# Patient Record
Sex: FEMALE | Race: WHITE | NOT HISPANIC OR LATINO | Employment: UNEMPLOYED | URBAN - METROPOLITAN AREA
[De-identification: names, ages, dates, MRNs, and addresses within clinical notes are randomized per-mention and may not be internally consistent; named-entity substitution may affect disease eponyms.]

---

## 2018-04-19 ENCOUNTER — HOSPITAL ENCOUNTER (EMERGENCY)
Facility: HOSPITAL | Age: 53
Discharge: HOME | End: 2018-04-19
Attending: EMERGENCY MEDICINE | Admitting: EMERGENCY MEDICINE
Payer: COMMERCIAL

## 2018-04-19 VITALS
HEART RATE: 93 BPM | BODY MASS INDEX: 35.87 KG/M2 | TEMPERATURE: 97.9 F | HEIGHT: 61 IN | RESPIRATION RATE: 16 BRPM | SYSTOLIC BLOOD PRESSURE: 170 MMHG | OXYGEN SATURATION: 95 % | WEIGHT: 190 LBS | DIASTOLIC BLOOD PRESSURE: 79 MMHG

## 2018-04-19 DIAGNOSIS — L02.91 ABSCESS: Primary | ICD-10-CM

## 2018-04-19 PROCEDURE — 99282 EMERGENCY DEPT VISIT SF MDM: CPT

## 2018-04-19 RX ORDER — LIDOCAINE HYDROCHLORIDE 10 MG/ML
5 INJECTION, SOLUTION EPIDURAL; INFILTRATION; INTRACAUDAL; PERINEURAL ONCE
Status: COMPLETED | OUTPATIENT
Start: 2018-04-19 | End: 2018-04-19

## 2018-04-19 RX ADMIN — LIDOCAINE HYDROCHLORIDE 5 ML: 10 INJECTION, SOLUTION EPIDURAL; INFILTRATION; INTRACAUDAL; PERINEURAL at 18:14

## 2018-04-19 NOTE — ED NOTES
Hard, red, swollen, tender approx size of quarter area on chest  Started about 1 week ago  Pt reports no drainage       Dorothea Mcqueen RN  04/19/18 4420

## 2018-04-19 NOTE — ED PROVIDER NOTES
History  Chief Complaint   Patient presents with    Abscess     abscess in middle of chest     6-year-old female presents today complaining of an abscess in the center of her chest which has been present for approximately 1 week  States she tried to pop it herself but was unsuccessful  Denies fever  Denies previous abscesses  History provided by:  Patient  Abscess   Location:  Torso  Torso abscess location: Central chest   Size:  2  Abscess quality: induration, painful and redness    Abscess quality: no fluctuance    Red streaking: no    Duration:  1 week  Progression:  Worsening  Pain details:     Quality:  Aching    Severity:  Moderate    Duration:  1 week    Timing:  Constant    Progression:  Worsening  Chronicity:  New  Relieved by:  None tried  Worsened by:  Nothing  Ineffective treatments:  None tried  Associated symptoms: no fatigue, no fever and no headaches    Risk factors: no family hx of MRSA, no hx of MRSA and no prior abscess        None       History reviewed  No pertinent past medical history  Past Surgical History:   Procedure Laterality Date    NEPHRECTOMY         History reviewed  No pertinent family history  I have reviewed and agree with the history as documented  Social History   Substance Use Topics    Smoking status: Current Every Day Smoker     Types: Cigarettes    Smokeless tobacco: Never Used    Alcohol use No        Review of Systems   Constitutional: Negative for chills, fatigue and fever  HENT: Negative for postnasal drip, sore throat and trouble swallowing  Respiratory: Negative for chest tightness and shortness of breath  Gastrointestinal: Negative for abdominal pain  Genitourinary: Negative for dysuria  Skin: Positive for rash  Allergic/Immunologic: Negative for immunocompromised state  Neurological: Negative for dizziness, light-headedness and headaches         Physical Exam  ED Triage Vitals [04/19/18 1703]   Temperature Pulse Respirations Blood Pressure SpO2   97 9 °F (36 6 °C) 93 16 170/79 95 %      Temp Source Heart Rate Source Patient Position - Orthostatic VS BP Location FiO2 (%)   Oral Monitor Sitting Left arm --      Pain Score       No Pain           Orthostatic Vital Signs  Vitals:    04/19/18 1703   BP: 170/79   Pulse: 93   Patient Position - Orthostatic VS: Sitting       Physical Exam   Constitutional: She appears well-developed and well-nourished  HENT:   Head: Normocephalic and atraumatic  Pulmonary/Chest: Effort normal        Small 2 cm area of induration and fluctuance which is extremely tender to touch and center of the chest   There is no pointing or drainage present  Skin: Skin is warm and dry  Psychiatric: She has a normal mood and affect  Nursing note and vitals reviewed  ED Medications  Medications   lidocaine (PF) (XYLOCAINE-MPF) 1 % injection 5 mL (5 mL Infiltration Given 4/19/18 1814)       Diagnostic Studies  Results Reviewed     None                 No orders to display              Procedures  Incision/Drainage  Date/Time: 4/19/2018 6:47 PM  Performed by: Deepak Liu by: Kraig Irvin     Patient location:  ED  Consent:     Consent obtained:  Verbal    Consent given by:  Patient    Risks discussed:  Bleeding, incomplete drainage and pain    Alternatives discussed:  No treatment and delayed treatment  Universal protocol:     Procedure explained and questions answered to patient or proxy's satisfaction: yes      Patient identity confirmed:  Verbally with patient  Location:     Type:  Abscess    Size:  2    Location:  Trunk    Trunk location:  Chest  Pre-procedure details:     Skin preparation:  Betadine  Anesthesia (see MAR for exact dosages):      Anesthesia method:  Local infiltration    Local anesthetic:  Lidocaine 1% w/o epi  Procedure details:     Complexity:  Simple    Needle aspiration: no      Incision types:  Stab incision    Scalpel blade:  11    Wound management:  Probed and deloculated Drainage:  Purulent    Drainage amount: Moderate    Wound treatment:  Wound left open    Packing materials:  None  Post-procedure details:     Patient tolerance of procedure: Tolerated well, no immediate complications           Phone Contacts  ED Phone Contact    ED Course  ED Course                                MDM  Number of Diagnoses or Management Options  Abscess: new and requires workup  Risk of Complications, Morbidity, and/or Mortality  Presenting problems: low  Management options: low    Patient Progress  Patient progress: stable    CritCare Time    Disposition  Final diagnoses:   Abscess     Time reflects when diagnosis was documented in both MDM as applicable and the Disposition within this note     Time User Action Codes Description Comment    4/19/2018  6:08 PM Roosevelt Johns Add [L02 91] Abscess       ED Disposition     ED Disposition Condition Comment    Discharge  [facility] discharge to home/self care  Condition at discharge: Stable        Follow-up Information     Follow up With Specialties Details Why Contact Info Osbaldo Key DO Internal Medicine Schedule an appointment as soon as possible for a visit  [place] [id number] [address] [place] [address] Emergency Department Emergency Medicine In 2 days For wound re-check [address] [address] ED, [address] [place] [address], [phone]        There are no discharge medications for this patient  No discharge procedures on file      ED Provider  Electronically Signed by           Juan Khan DO  04/19/18 7118

## 2018-04-19 NOTE — DISCHARGE INSTRUCTIONS
Abscess   WHAT YOU NEED TO KNOW:   A warm compress may help your abscess drain  Your healthcare provider may make a cut in the abscess so it can drain  You may need surgery to remove an abscess that is on your hands or buttocks  DISCHARGE INSTRUCTIONS:   Return to the emergency department if:   · The area around your abscess becomes very painful, warm, or has red streaks  · You have a fever and chills  · Your heart is beating faster than usual      · You feel faint or confused  Contact your healthcare provider if:   · Your abscess gets bigger or does not get better  · Your abscess returns  · You have questions or concerns about your condition or care  Medicines: You may  need any of the following:  · Antibiotics  help treat a bacterial infection  · Acetaminophen  decreases pain and fever  It is available without a doctor's order  Ask how much to take and how often to take it  Follow directions  Acetaminophen can cause liver damage if not taken correctly  · NSAIDs , such as ibuprofen, help decrease swelling, pain, and fever  This medicine is available with or without a doctor's order  NSAIDs can cause stomach bleeding or kidney problems in certain people  If you take blood thinner medicine, always ask your healthcare provider if NSAIDs are safe for you  Always read the medicine label and follow directions  · Take your medicine as directed  Contact your healthcare provider if you think your medicine is not helping or if you have side effects  Tell him or her if you are allergic to any medicine  Keep a list of the medicines, vitamins, and herbs you take  Include the amounts, and when and why you take them  Bring the list or the pill bottles to follow-up visits  Carry your medicine list with you in case of an emergency  Self-care:   · Apply a warm compress to your abscess  This will help it open and drain  Wet a washcloth in warm, but not hot, water  Apply the compress for 10 minutes  Repeat this 4 times each day  Do not  press on an abscess or try to open it with a needle  You may push the bacteria deeper or into your blood  · Do not share your clothes, towels, or sheets with anyone  This can spread the infection to others  · Wash your hands often  This can help prevent the spread of germs  Use soap and water or an alcohol-based hand rub  Care for your wound after it is drained:   · Care for your wound as directed  If your healthcare provider says it is okay, carefully remove the bandage and gauze packing  You may need to soak the gauze to get it out of your wound  Clean your wound and the area around it as directed  Dry the area and put on new, clean bandages  Change your bandages when they get wet or dirty  · Ask your healthcare provider how to change the gauze in your wound  Keep track of how many pieces of gauze are placed inside the wound  Do not put too much packing in the wound  Do not pack the gauze too tightly in your wound  Follow up with your healthcare provider in 1 to 3 days: You may need to have your packing removed or your bandage changed  Write down your questions so you remember to ask them during your visits  © 2017 [employer]  Information is for End User's use only and may not be sold, redistributed or otherwise used for commercial purposes  All illustrations and images included in CareNotes® are the copyrighted property of [employer]  or [employer] Santana  The above information is an  only  It is not intended as medical advice for individual conditions or treatments  Talk to your doctor, nurse or pharmacist before following any medical regimen to see if it is safe and effective for you

## 2018-06-21 ENCOUNTER — HOSPITAL ENCOUNTER (EMERGENCY)
Facility: HOSPITAL | Age: 53
Discharge: HOME | End: 2018-06-21
Attending: EMERGENCY MEDICINE | Admitting: EMERGENCY MEDICINE
Payer: COMMERCIAL

## 2018-06-21 ENCOUNTER — APPOINTMENT (EMERGENCY)
Dept: CT IMAGING | Facility: HOSPITAL | Age: 53
End: 2018-06-21
Payer: COMMERCIAL

## 2018-06-21 VITALS
BODY MASS INDEX: 44.56 KG/M2 | HEART RATE: 90 BPM | TEMPERATURE: 98.4 F | OXYGEN SATURATION: 95 % | WEIGHT: 236 LBS | SYSTOLIC BLOOD PRESSURE: 112 MMHG | HEIGHT: 61 IN | RESPIRATION RATE: 16 BRPM | DIASTOLIC BLOOD PRESSURE: 59 MMHG

## 2018-06-21 DIAGNOSIS — R10.32 LEFT LOWER QUADRANT PAIN: Primary | ICD-10-CM

## 2018-06-21 DIAGNOSIS — K57.92 DIVERTICULITIS: ICD-10-CM

## 2018-06-21 LAB
ALBUMIN SERPL BCP-MCNC: 3.8 G/DL (ref 3.5–5)
ALP SERPL-CCNC: 75 U/L (ref 46–116)
ALT SERPL W P-5'-P-CCNC: 41 U/L (ref 12–78)
ANION GAP SERPL CALCULATED.3IONS-SCNC: 9 MMOL/L (ref 4–13)
AST SERPL W P-5'-P-CCNC: 20 U/L (ref 5–45)
BASOPHILS # BLD AUTO: 0.1 THOUSANDS/ΜL (ref 0–0.1)
BASOPHILS NFR BLD AUTO: 1 % (ref 0–1)
BILIRUB SERPL-MCNC: 0.3 MG/DL (ref 0.2–1)
BUN SERPL-MCNC: 18 MG/DL (ref 5–25)
CALCIUM SERPL-MCNC: 9.8 MG/DL (ref 8.3–10.1)
CHLORIDE SERPL-SCNC: 101 MMOL/L (ref 100–108)
CO2 SERPL-SCNC: 28 MMOL/L (ref 21–32)
CREAT SERPL-MCNC: 1.02 MG/DL (ref 0.6–1.3)
EOSINOPHIL # BLD AUTO: 0.19 THOUSAND/ΜL (ref 0–0.61)
EOSINOPHIL NFR BLD AUTO: 2 % (ref 0–6)
ERYTHROCYTE [DISTWIDTH] IN BLOOD BY AUTOMATED COUNT: 13.4 % (ref 11.6–15.1)
GFR SERPL CREATININE-BSD FRML MDRD: 64 ML/MIN/1.73SQ M
GLUCOSE SERPL-MCNC: 111 MG/DL (ref 65–140)
HCT VFR BLD AUTO: 45.6 % (ref 34.8–46.1)
HGB BLD-MCNC: 15 G/DL (ref 11.5–15.4)
IMM GRANULOCYTES # BLD AUTO: 0.12 THOUSAND/UL (ref 0–0.2)
IMM GRANULOCYTES NFR BLD AUTO: 1 % (ref 0–2)
LIPASE SERPL-CCNC: 142 U/L (ref 73–393)
LYMPHOCYTES # BLD AUTO: 3.55 THOUSANDS/ΜL (ref 0.6–4.47)
LYMPHOCYTES NFR BLD AUTO: 32 % (ref 14–44)
MCH RBC QN AUTO: 29 PG (ref 26.8–34.3)
MCHC RBC AUTO-ENTMCNC: 32.9 G/DL (ref 31.4–37.4)
MCV RBC AUTO: 88 FL (ref 82–98)
MONOCYTES # BLD AUTO: 0.8 THOUSAND/ΜL (ref 0.17–1.22)
MONOCYTES NFR BLD AUTO: 7 % (ref 4–12)
NEUTROPHILS # BLD AUTO: 6.5 THOUSANDS/ΜL (ref 1.85–7.62)
NEUTS SEG NFR BLD AUTO: 57 % (ref 43–75)
NRBC BLD AUTO-RTO: 0 /100 WBCS
PLATELET # BLD AUTO: 337 THOUSANDS/UL (ref 149–390)
PMV BLD AUTO: 9.1 FL (ref 8.9–12.7)
POTASSIUM SERPL-SCNC: 3.9 MMOL/L (ref 3.5–5.3)
PROT SERPL-MCNC: 8.2 G/DL (ref 6.4–8.2)
RBC # BLD AUTO: 5.18 MILLION/UL (ref 3.81–5.12)
SODIUM SERPL-SCNC: 138 MMOL/L (ref 136–145)
WBC # BLD AUTO: 11.26 THOUSAND/UL (ref 4.31–10.16)

## 2018-06-21 PROCEDURE — 99284 EMERGENCY DEPT VISIT MOD MDM: CPT

## 2018-06-21 PROCEDURE — 74176 CT ABD & PELVIS W/O CONTRAST: CPT

## 2018-06-21 PROCEDURE — 80053 COMPREHEN METABOLIC PANEL: CPT | Performed by: EMERGENCY MEDICINE

## 2018-06-21 PROCEDURE — 36415 COLL VENOUS BLD VENIPUNCTURE: CPT | Performed by: EMERGENCY MEDICINE

## 2018-06-21 PROCEDURE — 85025 COMPLETE CBC W/AUTO DIFF WBC: CPT | Performed by: EMERGENCY MEDICINE

## 2018-06-21 PROCEDURE — 83690 ASSAY OF LIPASE: CPT | Performed by: EMERGENCY MEDICINE

## 2018-06-21 RX ORDER — CIPROFLOXACIN 500 MG/1
500 TABLET, FILM COATED ORAL ONCE
Status: COMPLETED | OUTPATIENT
Start: 2018-06-21 | End: 2018-06-21

## 2018-06-21 RX ORDER — METRONIDAZOLE 500 MG/1
500 TABLET ORAL ONCE
Status: COMPLETED | OUTPATIENT
Start: 2018-06-21 | End: 2018-06-21

## 2018-06-21 RX ORDER — METRONIDAZOLE 500 MG/1
500 TABLET ORAL EVERY 8 HOURS SCHEDULED
Qty: 30 TABLET | Refills: 0 | Status: SHIPPED | OUTPATIENT
Start: 2018-06-21 | End: 2018-07-01

## 2018-06-21 RX ORDER — CIPROFLOXACIN 500 MG/1
500 TABLET, FILM COATED ORAL EVERY 12 HOURS SCHEDULED
Qty: 20 TABLET | Refills: 0 | Status: SHIPPED | OUTPATIENT
Start: 2018-06-21 | End: 2018-07-01

## 2018-06-21 RX ADMIN — CIPROFLOXACIN 500 MG: 500 TABLET, FILM COATED ORAL at 22:03

## 2018-06-21 RX ADMIN — METRONIDAZOLE 500 MG: 500 TABLET ORAL at 22:03

## 2018-06-21 NOTE — ED PROVIDER NOTES
History  Chief Complaint   Patient presents with    Abdominal Pain     pt states "diverticulitis is acting up"      55yo female is coming in with LLQ pain, aching that she states is her "diverticulitis", pt states she has had nearly a dozen bouts with diveriticulitis all treated with two abx that is went away a few days after starting treatment  She has never had a perforation or a complication  She has never seen or talked about surgery for recurrent diverticulitis  Her last episode was a few months ago  Has h/o also of a nephrectomy for kidney cancer but has been in remission for years  History provided by:  Patient  Abdominal Pain   Pain location:  LLQ  Pain quality: aching    Pain radiates to:  Does not radiate  Pain severity:  Moderate  Onset quality:  Gradual  Duration:  1 week  Timing:  Constant  Progression:  Worsening  Chronicity:  Recurrent  Context: not previous surgeries (h/o nephrectomy for renal cancer in the past, had h/o recurrent diverticulitis for the past she states a dozen times and always gets better with abx)    Relieved by:  Nothing  Worsened by: Movement  Ineffective treatments:  None tried  Associated symptoms: no chest pain, no chills, no constipation, no dysuria, no fatigue, no hematuria, no nausea, no shortness of breath and no vomiting    Risk factors: obesity    Risk factors: no NSAID use and no recent hospitalization        None       Past Medical History:   Diagnosis Date    Diabetes mellitus ([facility] [address] )     Diverticulitis        Past Surgical History:   Procedure Laterality Date    NEPHRECTOMY      NEPHRECTOMY Right        History reviewed  No pertinent family history  I have reviewed and agree with the history as documented  Social History   Substance Use Topics    Smoking status: Current Every Day Smoker     Types: Cigarettes    Smokeless tobacco: Never Used    Alcohol use No        Review of Systems   Constitutional: Negative for chills and fatigue     Respiratory: Negative for shortness of breath  Cardiovascular: Negative for chest pain  Gastrointestinal: Positive for abdominal pain  Negative for constipation, nausea and vomiting  Genitourinary: Negative for dysuria and hematuria  All other systems reviewed and are negative  Physical Exam  Physical Exam   Constitutional: She is oriented to person, place, and time  She appears well-developed and well-nourished  No distress  HENT:   Head: Normocephalic and atraumatic  Eyes: Conjunctivae and EOM are normal  Pupils are equal, round, and reactive to light  Neck: Normal range of motion  Neck supple  Cardiovascular: Normal rate, regular rhythm and normal heart sounds  Pulmonary/Chest: Effort normal and breath sounds normal  No respiratory distress  She has no wheezes  Abdominal: Soft  Bowel sounds are normal  She exhibits no distension  There is tenderness (LLQ)  There is no rebound  Musculoskeletal: Normal range of motion  She exhibits no edema  Neurological: She is alert and oriented to person, place, and time  Skin: Skin is warm and dry  She is not diaphoretic  Psychiatric: She has a normal mood and affect  Nursing note and vitals reviewed        Vital Signs  ED Triage Vitals [06/21/18 1754]   Temperature Pulse Respirations Blood Pressure SpO2   98 4 °F (36 9 °C) 95 16 151/83 94 %      Temp Source Heart Rate Source Patient Position - Orthostatic VS BP Location FiO2 (%)   Oral Monitor Sitting Left arm --      Pain Score       5           Vitals:    06/21/18 1754 06/21/18 2108   BP: 151/83 112/59   Pulse: 95 90   Patient Position - Orthostatic VS: Sitting        Visual Acuity      ED Medications  Medications   ciprofloxacin (CIPRO) tablet 500 mg (500 mg Oral Given 6/21/18 2203)   metroNIDAZOLE (FLAGYL) tablet 500 mg (500 mg Oral Given 6/21/18 2203)       Diagnostic Studies  Results Reviewed     Procedure Component Value Units Date/Time    Comprehensive metabolic panel [[id number]] Collected: 06/21/18 1939    Lab Status:  Final result Specimen:  Blood from Arm, Right Updated:  06/21/18 2002     Sodium 138 mmol/L      Potassium 3 9 mmol/L      Chloride 101 mmol/L      CO2 28 mmol/L      Anion Gap 9 mmol/L      BUN 18 mg/dL      Creatinine 1 02 mg/dL      Glucose 111 mg/dL      Calcium 9 8 mg/dL      AST 20 U/L      ALT 41 U/L      Alkaline Phosphatase 75 U/L      Total Protein 8 2 g/dL      Albumin 3 8 g/dL      Total Bilirubin 0 30 mg/dL      eGFR 64 ml/min/1 73sq m     Narrative:         National Kidney Disease Education Program recommendations are as follows:  GFR calculation is accurate only with a steady state creatinine  Chronic Kidney disease less than 60 ml/min/1 73 sq  meters  Kidney failure less than 15 ml/min/1 73 sq  meters  Lipase [[id number]]  (Normal) Collected:  06/21/18 1939    Lab Status:  Final result Specimen:  Blood from Arm, Right Updated:  06/21/18 2002     Lipase 142 u/L     CBC and differential [[id number]]  (Abnormal) Collected:  06/21/18 1939    Lab Status:  Final result Specimen:  Blood from Arm, Right Updated:  06/21/18 1945     WBC 11 26 (H) Thousand/uL      RBC 5 18 (H) Million/uL      Hemoglobin 15 0 g/dL      Hematocrit 45 6 %      MCV 88 fL      MCH 29 0 pg      MCHC 32 9 g/dL      RDW 13 4 %      MPV 9 1 fL      Platelets 675 Thousands/uL      nRBC 0 /100 WBCs      Neutrophils Relative 57 %      Immat GRANS % 1 %      Lymphocytes Relative 32 %      Monocytes Relative 7 %      Eosinophils Relative 2 %      Basophils Relative 1 %      Neutrophils Absolute 6 50 Thousands/µL      Immature Grans Absolute 0 12 Thousand/uL      Lymphocytes Absolute 3 55 Thousands/µL      Monocytes Absolute 0 80 Thousand/µL      Eosinophils Absolute 0 19 Thousand/µL      Basophils Absolute 0 10 Thousands/µL                  CT abdomen pelvis wo contrast   Final Result by Praful Cano MD (06/21 8840)      Uncomplicated sigmoid diverticulitis  Hepatic steatosis and hepatomegaly  Workstation performed: [id number]                    Procedures  Procedures       Phone Contacts  ED Phone Contact    ED Course                               MDM  Number of Diagnoses or Management Options  Diverticulitis: new and requires workup  Left lower quadrant pain: new and requires workup     Amount and/or Complexity of Data Reviewed  Clinical lab tests: ordered and reviewed  Tests in the radiology section of CPT®: ordered and reviewed    Risk of Complications, Morbidity, and/or Mortality  Presenting problems: high    Patient Progress  Patient progress: improved    CritCare Time    Disposition  Final diagnoses:   Left lower quadrant pain   Diverticulitis     Time reflects when diagnosis was documented in both MDM as applicable and the Disposition within this note     Time User Action Codes Description Comment    6/21/2018  9:04 PM Jose Alberto Bowser Add [R10 32] Left lower quadrant pain     6/21/2018  9:48 PM Lamar LÓPEZ Add [K57 92] Diverticulitis       ED Disposition     ED Disposition Condition Comment    Discharge  Sushma Villalobos discharge to home/self care      Condition at discharge: Good        Follow-up Information     Follow up With Specialties Details Why [address] [place] [address], DO Internal Medicine   [address] Surgery   [address] [place] [address] [address] [place] [address], MD Gastroenterology   [address]  [facility] [address]  [phone]            Discharge Medication List as of 6/21/2018  9:05 PM      START taking these medications    Details   ciprofloxacin (CIPRO) 500 mg tablet Take 1 tablet (500 mg total) by mouth every 12 (twelve) hours for 10 days, Starting Thu 6/21/2018, Until Sun 7/1/2018, Print      metroNIDAZOLE (FLAGYL) 500 mg tablet Take 1 tablet (500 mg total) by mouth every 8 (eight) hours for 10 days, Starting Thu 6/21/2018, Until Sun 7/1/2018, Print No discharge procedures on file      ED Provider  Electronically Signed by           Thiago Herrera MD  06/22/18 5664

## 2018-06-21 NOTE — ED NOTES
Patient states she is not able to urinate at this time   Will ring the call [facility] when she wants to try        Veronica Marie, RN  06/21/18 4568

## 2018-06-22 NOTE — DISCHARGE INSTRUCTIONS
Abdominal Pain   WHAT YOU NEED TO KNOW:   Abdominal pain can be dull, achy, or sharp  You may have pain in one area of your abdomen, or in your entire abdomen  Your pain may be caused by a condition such as constipation, food sensitivity or poisoning, infection, or a blockage  Abdominal pain can also be from a hernia, appendicitis, or an ulcer  Liver, gallbladder, or kidney conditions can also cause abdominal pain  The cause of your abdominal pain may be unknown  DISCHARGE INSTRUCTIONS:   Return to the emergency department if:   · You have new chest pain or shortness of breath  · You have pulsing pain in your upper abdomen or lower back that suddenly becomes constant  · Your pain is in the right lower abdominal area and worsens with movement  · You have a fever over 100 4°F (38°C) or shaking chills  · You are vomiting and cannot keep food or liquids down  · Your pain does not improve or gets worse over the next 8 to 12 hours  · You see blood in your vomit or bowel movements, or they look black and tarry  · Your skin or the whites of your eyes turn yellow  · You are a woman and have a large amount of vaginal bleeding that is not your monthly period  Contact your healthcare provider if:   · You have pain in your lower back  · You are a man and have pain in your testicles  · You have pain when you urinate  · You have questions or concerns about your condition or care  Follow up with your healthcare provider within 24 hours or as directed:  Write down your questions so you remember to ask them during your visits  Medicines:   · Medicines  may be given to calm your stomach and prevent vomiting or to decrease pain  Ask how to take pain medicine safely  · Take your medicine as directed  Contact your healthcare provider if you think your medicine is not helping or if you have side effects  Tell him of her if you are allergic to any medicine   Keep a list of the medicines, vitamins, and herbs you take  Include the amounts, and when and why you take them  Bring the list or the pill bottles to follow-up visits  Carry your medicine list with you in case of an emergency  © 2017 [employer] Information is for End User's use only and may not be sold, redistributed or otherwise used for commercial purposes  All illustrations and images included in CareNotes® are the copyrighted property of A D A M , [employer]  or [employer]ana  The above information is an  only  It is not intended as medical advice for individual conditions or treatments  Talk to your doctor, nurse or pharmacist before following any medical regimen to see if it is safe and effective for you  Diverticulitis   WHAT YOU NEED TO KNOW:   Diverticulitis is a condition that causes small pockets along your intestine called diverticula to become inflamed or infected  This is caused by hard bowel movements, food, or bacteria that get stuck in the pockets  DISCHARGE INSTRUCTIONS:   Return to the emergency department if:   · You have bowel movement or foul-smelling discharge leaking from your vagina or in your urine  · You have severe diarrhea  · You urinate less than usual or not at all  · You are not able to have a bowel movement  · You cannot stop vomiting  · You have severe abdominal pain, a fever, and your abdomen is larger than usual      · You have new or increased blood in your bowel movements  Contact your healthcare provider if:   · You have pain when you urinate  · Your symptoms get worse or do not go away  · You have questions or concerns about your condition or care  Medicines:   · Antibiotics  may be given to help treat a bacterial infection  · Prescription pain medicine  may be given  Ask your healthcare provider how to take this medicine safely  Some prescription pain medicines contain acetaminophen   Do not take other medicines that contain acetaminophen without talking to your healthcare provider  Too much acetaminophen may cause liver damage  Prescription pain medicine may cause constipation  Ask your healthcare provider how to prevent or treat constipation  · Take your medicine as directed  Contact your healthcare provider if you think your medicine is not helping or if you have side effects  Tell him or her if you are allergic to any medicine  Keep a list of the medicines, vitamins, and herbs you take  Include the amounts, and when and why you take them  Bring the list or the pill bottles to follow-up visits  Carry your medicine list with you in case of an emergency  Clear liquid diet:  A clear liquid diet includes any liquids that you can see through  Examples include water, ginger-khadar, cranberry or apple juice, frozen fruit ice, or broth  Stay on a clear liquid diet until your symptoms are gone, or as directed  Follow up with your healthcare provider as directed: You may need to return for a colonoscopy  When your symptoms are gone, you may need a low-fat, high-fiber diet to prevent diverticulitis from developing again  Your healthcare provider or dietitian can help you create meal plans  Write down your questions so you remember to ask them during your visits  © 2017 [employer]  Information is for End User's use only and may not be sold, redistributed or otherwise used for commercial purposes  All illustrations and images included in CareNotes® are the copyrighted property of [employer]  or [employer]oko Santana  The above information is an  only  It is not intended as medical advice for individual conditions or treatments  Talk to your doctor, nurse or pharmacist before following any medical regimen to see if it is safe and effective for you

## 2018-08-13 ENCOUNTER — HOSPITAL ENCOUNTER (INPATIENT)
Facility: HOSPITAL | Age: 53
LOS: 5 days | Discharge: HOME | DRG: 244 | End: 2018-08-18
Attending: EMERGENCY MEDICINE | Admitting: INTERNAL MEDICINE
Payer: COMMERCIAL

## 2018-08-13 ENCOUNTER — APPOINTMENT (EMERGENCY)
Dept: CT IMAGING | Facility: HOSPITAL | Age: 53
DRG: 244 | End: 2018-08-13
Payer: COMMERCIAL

## 2018-08-13 DIAGNOSIS — K57.92 DIVERTICULITIS: Primary | ICD-10-CM

## 2018-08-13 LAB
ALBUMIN SERPL BCP-MCNC: 3.3 G/DL (ref 3.5–5)
ALP SERPL-CCNC: 71 U/L (ref 46–116)
ALT SERPL W P-5'-P-CCNC: 24 U/L (ref 12–78)
ANION GAP SERPL CALCULATED.3IONS-SCNC: 6 MMOL/L (ref 4–13)
AST SERPL W P-5'-P-CCNC: 16 U/L (ref 5–45)
BACTERIA UR QL AUTO: ABNORMAL /HPF
BASOPHILS # BLD AUTO: 0.05 THOUSANDS/ΜL (ref 0–0.1)
BASOPHILS NFR BLD AUTO: 0 % (ref 0–1)
BILIRUB SERPL-MCNC: 0.6 MG/DL (ref 0.2–1)
BILIRUB UR QL STRIP: ABNORMAL
BUN SERPL-MCNC: 9 MG/DL (ref 5–25)
CALCIUM SERPL-MCNC: 9.5 MG/DL (ref 8.3–10.1)
CHLORIDE SERPL-SCNC: 99 MMOL/L (ref 100–108)
CLARITY UR: CLEAR
CO2 SERPL-SCNC: 31 MMOL/L (ref 21–32)
COLOR UR: ABNORMAL
CREAT SERPL-MCNC: 0.92 MG/DL (ref 0.6–1.3)
EOSINOPHIL # BLD AUTO: 0.14 THOUSAND/ΜL (ref 0–0.61)
EOSINOPHIL NFR BLD AUTO: 1 % (ref 0–6)
ERYTHROCYTE [DISTWIDTH] IN BLOOD BY AUTOMATED COUNT: 13.5 % (ref 11.6–15.1)
GFR SERPL CREATININE-BSD FRML MDRD: 72 ML/MIN/1.73SQ M
GLUCOSE SERPL-MCNC: 141 MG/DL (ref 65–140)
GLUCOSE SERPL-MCNC: 148 MG/DL (ref 65–140)
GLUCOSE SERPL-MCNC: 155 MG/DL (ref 65–140)
GLUCOSE UR STRIP-MCNC: ABNORMAL MG/DL
HCT VFR BLD AUTO: 42.9 % (ref 34.8–46.1)
HGB BLD-MCNC: 14.6 G/DL (ref 11.5–15.4)
HGB UR QL STRIP.AUTO: ABNORMAL
IMM GRANULOCYTES # BLD AUTO: 0.12 THOUSAND/UL (ref 0–0.2)
IMM GRANULOCYTES NFR BLD AUTO: 1 % (ref 0–2)
KETONES UR STRIP-MCNC: NEGATIVE MG/DL
LEUKOCYTE ESTERASE UR QL STRIP: NEGATIVE
LIPASE SERPL-CCNC: 224 U/L (ref 73–393)
LYMPHOCYTES # BLD AUTO: 2.66 THOUSANDS/ΜL (ref 0.6–4.47)
LYMPHOCYTES NFR BLD AUTO: 21 % (ref 14–44)
MCH RBC QN AUTO: 30 PG (ref 26.8–34.3)
MCHC RBC AUTO-ENTMCNC: 34 G/DL (ref 31.4–37.4)
MCV RBC AUTO: 88 FL (ref 82–98)
MONOCYTES # BLD AUTO: 0.92 THOUSAND/ΜL (ref 0.17–1.22)
MONOCYTES NFR BLD AUTO: 7 % (ref 4–12)
NEUTROPHILS # BLD AUTO: 8.73 THOUSANDS/ΜL (ref 1.85–7.62)
NEUTS SEG NFR BLD AUTO: 70 % (ref 43–75)
NITRITE UR QL STRIP: NEGATIVE
NON-SQ EPI CELLS URNS QL MICRO: ABNORMAL /HPF
NRBC BLD AUTO-RTO: 0 /100 WBCS
PH UR STRIP.AUTO: 6 [PH] (ref 4.5–8)
PLATELET # BLD AUTO: 326 THOUSANDS/UL (ref 149–390)
PMV BLD AUTO: 9.7 FL (ref 8.9–12.7)
POTASSIUM SERPL-SCNC: 3.6 MMOL/L (ref 3.5–5.3)
PROT SERPL-MCNC: 8.1 G/DL (ref 6.4–8.2)
PROT UR STRIP-MCNC: >=300 MG/DL
RBC # BLD AUTO: 4.87 MILLION/UL (ref 3.81–5.12)
RBC #/AREA URNS AUTO: ABNORMAL /HPF
SODIUM SERPL-SCNC: 136 MMOL/L (ref 136–145)
SP GR UR STRIP.AUTO: >=1.03 (ref 1–1.03)
UROBILINOGEN UR QL STRIP.AUTO: 2 E.U./DL
WBC # BLD AUTO: 12.62 THOUSAND/UL (ref 4.31–10.16)
WBC #/AREA URNS AUTO: ABNORMAL /HPF

## 2018-08-13 PROCEDURE — 36415 COLL VENOUS BLD VENIPUNCTURE: CPT | Performed by: PHYSICIAN ASSISTANT

## 2018-08-13 PROCEDURE — 80053 COMPREHEN METABOLIC PANEL: CPT | Performed by: PHYSICIAN ASSISTANT

## 2018-08-13 PROCEDURE — 96361 HYDRATE IV INFUSION ADD-ON: CPT

## 2018-08-13 PROCEDURE — 82948 REAGENT STRIP/BLOOD GLUCOSE: CPT

## 2018-08-13 PROCEDURE — 96375 TX/PRO/DX INJ NEW DRUG ADDON: CPT

## 2018-08-13 PROCEDURE — 81001 URINALYSIS AUTO W/SCOPE: CPT | Performed by: PHYSICIAN ASSISTANT

## 2018-08-13 PROCEDURE — 83690 ASSAY OF LIPASE: CPT | Performed by: PHYSICIAN ASSISTANT

## 2018-08-13 PROCEDURE — 99285 EMERGENCY DEPT VISIT HI MDM: CPT

## 2018-08-13 PROCEDURE — 74177 CT ABD & PELVIS W/CONTRAST: CPT

## 2018-08-13 PROCEDURE — 85025 COMPLETE CBC W/AUTO DIFF WBC: CPT | Performed by: PHYSICIAN ASSISTANT

## 2018-08-13 PROCEDURE — 96374 THER/PROPH/DIAG INJ IV PUSH: CPT

## 2018-08-13 RX ORDER — ONDANSETRON 2 MG/ML
4 INJECTION INTRAMUSCULAR; INTRAVENOUS EVERY 6 HOURS PRN
Status: DISCONTINUED | OUTPATIENT
Start: 2018-08-13 | End: 2018-08-18 | Stop reason: HOSPADM

## 2018-08-13 RX ORDER — ATORVASTATIN CALCIUM 40 MG/1
TABLET, FILM COATED ORAL
COMMUNITY
Start: 2018-08-09

## 2018-08-13 RX ORDER — ATORVASTATIN CALCIUM 40 MG/1
40 TABLET, FILM COATED ORAL
Status: DISCONTINUED | OUTPATIENT
Start: 2018-08-14 | End: 2018-08-18 | Stop reason: HOSPADM

## 2018-08-13 RX ORDER — MORPHINE SULFATE 2 MG/ML
1 INJECTION, SOLUTION INTRAMUSCULAR; INTRAVENOUS
Status: DISCONTINUED | OUTPATIENT
Start: 2018-08-13 | End: 2018-08-17

## 2018-08-13 RX ORDER — FENTANYL CITRATE 50 UG/ML
50 INJECTION, SOLUTION INTRAMUSCULAR; INTRAVENOUS ONCE
Status: COMPLETED | OUTPATIENT
Start: 2018-08-13 | End: 2018-08-13

## 2018-08-13 RX ORDER — METFORMIN HYDROCHLORIDE 500 MG/1
TABLET, EXTENDED RELEASE ORAL
COMMUNITY
Start: 2018-06-11

## 2018-08-13 RX ORDER — MORPHINE SULFATE 4 MG/ML
4 INJECTION, SOLUTION INTRAMUSCULAR; INTRAVENOUS ONCE
Status: COMPLETED | OUTPATIENT
Start: 2018-08-13 | End: 2018-08-13

## 2018-08-13 RX ORDER — SODIUM CHLORIDE 9 MG/ML
125 INJECTION, SOLUTION INTRAVENOUS CONTINUOUS
Status: DISCONTINUED | OUTPATIENT
Start: 2018-08-13 | End: 2018-08-17

## 2018-08-13 RX ORDER — FLUOXETINE HYDROCHLORIDE 20 MG/1
40 CAPSULE ORAL DAILY
Status: DISCONTINUED | OUTPATIENT
Start: 2018-08-14 | End: 2018-08-18 | Stop reason: HOSPADM

## 2018-08-13 RX ORDER — ONDANSETRON 2 MG/ML
4 INJECTION INTRAMUSCULAR; INTRAVENOUS ONCE
Status: COMPLETED | OUTPATIENT
Start: 2018-08-13 | End: 2018-08-13

## 2018-08-13 RX ORDER — DOCUSATE SODIUM 100 MG/1
100 CAPSULE, LIQUID FILLED ORAL 2 TIMES DAILY
Status: DISCONTINUED | OUTPATIENT
Start: 2018-08-14 | End: 2018-08-18 | Stop reason: HOSPADM

## 2018-08-13 RX ORDER — VARENICLINE TARTRATE 1 MG/1
TABLET, FILM COATED ORAL
COMMUNITY
Start: 2018-08-09

## 2018-08-13 RX ORDER — ACETAMINOPHEN 325 MG/1
650 TABLET ORAL EVERY 6 HOURS PRN
Status: DISCONTINUED | OUTPATIENT
Start: 2018-08-13 | End: 2018-08-18 | Stop reason: HOSPADM

## 2018-08-13 RX ORDER — FLUOXETINE HYDROCHLORIDE 40 MG/1
CAPSULE ORAL
COMMUNITY
Start: 2018-08-09

## 2018-08-13 RX ADMIN — SODIUM CHLORIDE 125 ML/HR: 0.9 INJECTION, SOLUTION INTRAVENOUS at 22:45

## 2018-08-13 RX ADMIN — MORPHINE SULFATE 4 MG: 4 INJECTION INTRAVENOUS at 18:38

## 2018-08-13 RX ADMIN — ONDANSETRON 4 MG: 2 INJECTION INTRAMUSCULAR; INTRAVENOUS at 18:37

## 2018-08-13 RX ADMIN — SODIUM CHLORIDE 1000 ML: 0.9 INJECTION, SOLUTION INTRAVENOUS at 18:36

## 2018-08-13 RX ADMIN — FENTANYL CITRATE 50 MCG: 50 INJECTION, SOLUTION INTRAMUSCULAR; INTRAVENOUS at 21:43

## 2018-08-13 RX ADMIN — PIPERACILLIN SODIUM,TAZOBACTAM SODIUM 3.38 G: 3; .375 INJECTION, POWDER, FOR SOLUTION INTRAVENOUS at 22:02

## 2018-08-13 RX ADMIN — IOHEXOL 100 ML: 350 INJECTION, SOLUTION INTRAVENOUS at 21:10

## 2018-08-13 NOTE — ED PROVIDER NOTES
History  Chief Complaint   Patient presents with    Abdominal Pain     pt co of LLQ abd pain onset 4 days ago, most w/ movememt  hx of derviticulitis, -v/d/f  + nausea  can only tolerate sips of water      73-year-old female presents to the emergency department with complaints of left lower quadrant abdominal pain  States she has had ongoing symptoms over the past 4 days  Also complains of decreased appetite with some watery stools  States she has history of diverticulitis which is previously diagnosed in June of 2018  States she did not finish her antibiotics (cipro & flagyl) at that time and restarted them 4 days ago when she started to pain  Denies fevers at home  No urinary symptoms  History provided by:  Patient   used: No    Abdominal Pain   Pain location:  LLQ  Pain quality: sharp and stabbing    Pain radiates to:  Does not radiate  Pain severity:  Moderate  Onset quality:  Gradual  Duration:  4 days  Timing:  Constant  Progression:  Waxing and waning  Chronicity:  New  Context: not alcohol use, not awakening from sleep, not diet changes, not eating, not laxative use, not medication withdrawal, not previous surgeries, not recent illness, not recent sexual activity, not recent travel, not retching, not sick contacts, not suspicious food intake and not trauma    Relieved by:  Nothing  Ineffective treatments: Antibiotics  Associated symptoms: no anorexia, no belching, no chest pain, no chills, no constipation, no cough, no diarrhea, no dysuria, no fatigue, no fever, no flatus, no hematemesis, no hematochezia, no hematuria, no melena, no nausea, no shortness of breath, no sore throat, no vaginal bleeding, no vaginal discharge and no vomiting        Prior to Admission Medications   Prescriptions Last Dose Informant Patient Reported? Taking?    FLUoxetine (PROzac) 40 MG capsule   Yes Yes   Sig: Take by mouth   atorvastatin (LIPITOR) 40 mg tablet   Yes Yes   Sig: Take by mouth metFORMIN (GLUCOPHAGE-XR) 500 mg 24 hr tablet   Yes Yes   Sig: Take by mouth   varenicline (CHANTIX) 1 mg tablet   Yes Yes   Sig: Take by mouth      Facility-Administered Medications: None       Past Medical History:   Diagnosis Date    Cancer ([facility] )     r kidney removed     Diabetes mellitus ([place] [address] )     Diverticulitis     Hyperlipidemia     Renal disorder        Past Surgical History:   Procedure Laterality Date    NEPHRECTOMY      NEPHRECTOMY Right        History reviewed  No pertinent family history  I have reviewed and agree with the history as documented  Social History   Substance Use Topics    Smoking status: Current Every Day Smoker     Types: Cigarettes    Smokeless tobacco: Never Used    Alcohol use No        Review of Systems   Constitutional: Negative for activity change, appetite change, chills, fatigue and fever  HENT: Negative for congestion, dental problem, drooling, ear discharge, ear pain, mouth sores, nosebleeds, rhinorrhea, sore throat and trouble swallowing  Eyes: Negative for pain, discharge and itching  Respiratory: Negative for cough, chest tightness, shortness of breath and wheezing  Cardiovascular: Negative for chest pain and palpitations  Gastrointestinal: Positive for abdominal pain  Negative for anorexia, blood in stool, constipation, diarrhea, flatus, hematemesis, hematochezia, melena, nausea and vomiting  Endocrine: Negative for cold intolerance and heat intolerance  Genitourinary: Negative for difficulty urinating, dysuria, flank pain, frequency, hematuria, urgency, vaginal bleeding and vaginal discharge  Skin: Negative for rash and wound  Allergic/Immunologic: Negative for food allergies and immunocompromised state  Neurological: Negative for dizziness, seizures, syncope, weakness, numbness and headaches  Psychiatric/Behavioral: Negative for agitation, behavioral problems and confusion         Physical Exam  Physical Exam   Constitutional: She is oriented to person, place, and time  She appears well-developed and well-nourished  No distress  HENT:   Head: Normocephalic and atraumatic  Right Ear: External ear normal    Left Ear: External ear normal    Mouth/Throat: Oropharynx is clear and moist  No oropharyngeal exudate  Eyes: Conjunctivae are normal    Neck: No JVD present  No tracheal deviation present  Cardiovascular: Normal rate, regular rhythm and normal heart sounds  Exam reveals no gallop and no friction rub  No murmur heard  Pulmonary/Chest: Effort normal and breath sounds normal  No respiratory distress  She has no wheezes  She has no rales  She exhibits no tenderness  Abdominal: Soft  Bowel sounds are normal  She exhibits no distension  There is tenderness in the left lower quadrant  There is no guarding  Musculoskeletal: Normal range of motion  She exhibits no edema, tenderness or deformity  Lymphadenopathy:     She has no cervical adenopathy  Neurological: She is alert and oriented to person, place, and time  Skin: Skin is warm and dry  No rash noted  She is not diaphoretic  No erythema  Psychiatric: She has a normal mood and affect  Nursing note and vitals reviewed        Vital Signs  ED Triage Vitals   Temperature Pulse Respirations Blood Pressure SpO2   08/13/18 1821 08/13/18 1821 08/13/18 1821 08/13/18 1821 08/13/18 1821   99 °F (37 2 °C) 105 19 122/78 95 %      Temp Source Heart Rate Source Patient Position - Orthostatic VS BP Location FiO2 (%)   08/13/18 1821 08/13/18 1821 08/13/18 1821 08/13/18 1821 --   Oral Monitor Sitting Right arm       Pain Score       08/13/18 1817       Worst Possible Pain           Vitals:    08/13/18 1821 08/13/18 1900 08/13/18 2124   BP: 122/78 125/64 112/58   Pulse: 105 91 83   Patient Position - Orthostatic VS: Sitting Lying Lying       Visual Acuity      ED Medications  Medications   fentanyl citrate (PF) 100 MCG/2ML 50 mcg (not administered)   piperacillin-tazobactam (ZOSYN) 3 375 g in sodium chloride 0 9 % 50 mL IVPB (not administered)   sodium chloride 0 9 % bolus 1,000 mL (1,000 mL Intravenous New Bag 8/13/18 1836)   morphine (PF) 4 mg/mL injection 4 mg (4 mg Intravenous Given 8/13/18 1838)   ondansetron (ZOFRAN) injection 4 mg (4 mg Intravenous Given 8/13/18 1837)   iohexol (OMNIPAQUE) 350 MG/ML injection (MULTI-DOSE) 100 mL (100 mL Intravenous Given 8/13/18 2110)       Diagnostic Studies  Results Reviewed     Procedure Component Value Units Date/Time    Urine Microscopic [[id number]]  (Abnormal) Collected:  08/13/18 1921    Lab Status:  Final result Specimen:  Urine from Urine, Clean Catch Updated:  08/13/18 1940     RBC, UA 0-1 (A) /hpf      WBC, UA 0-1 (A) /hpf      Epithelial Cells Moderate (A) /hpf      Bacteria, UA Occasional /hpf     UA w Reflex to Microscopic w Reflex to Culture [[id number]]  (Abnormal) Collected:  08/13/18 1921    Lab Status:  Final result Specimen:  Urine from Urine, Clean Catch Updated:  08/13/18 1932     Color, UA Isa     Clarity, UA Clear     Specific Gravity, UA >=1 030     pH, UA 6 0     Leukocytes, UA Negative     Nitrite, UA Negative     Protein, UA >=300 (A) mg/dl      Glucose,  (1/10%) (A) mg/dl      Ketones, UA Negative mg/dl      Urobilinogen, UA 2 0 (A) E U /dl      Bilirubin, UA Small (A)     Blood, UA Small (A)    Comprehensive metabolic panel [[id number]]  (Abnormal) Collected:  08/13/18 1836    Lab Status:  Final result Specimen:  Blood from Arm, Right Updated:  08/13/18 1901     Sodium 136 mmol/L      Potassium 3 6 mmol/L      Chloride 99 (L) mmol/L      CO2 31 mmol/L      Anion Gap 6 mmol/L      BUN 9 mg/dL      Creatinine 0 92 mg/dL      Glucose 155 (H) mg/dL      Calcium 9 5 mg/dL      AST 16 U/L      ALT 24 U/L      Alkaline Phosphatase 71 U/L      Total Protein 8 1 g/dL      Albumin 3 3 (L) g/dL      Total Bilirubin 0 60 mg/dL      eGFR 72 ml/min/1 73sq m     Narrative:         National Kidney Disease Education Program recommendations are as follows:  GFR calculation is accurate only with a steady state creatinine  Chronic Kidney disease less than 60 ml/min/1 73 sq  meters  Kidney failure less than 15 ml/min/1 73 sq  meters  Lipase [[id number]]  (Normal) Collected:  08/13/18 1836    Lab Status:  Final result Specimen:  Blood from Arm, Right Updated:  08/13/18 1901     Lipase 224 u/L     CBC and differential [[id number]]  (Abnormal) Collected:  08/13/18 1836    Lab Status:  Final result Specimen:  Blood from Arm, Right Updated:  08/13/18 1846     WBC 12 62 (H) Thousand/uL      RBC 4 87 Million/uL      Hemoglobin 14 6 g/dL      Hematocrit 42 9 %      MCV 88 fL      MCH 30 0 pg      MCHC 34 0 g/dL      RDW 13 5 %      MPV 9 7 fL      Platelets 297 Thousands/uL      nRBC 0 /100 WBCs      Neutrophils Relative 70 %      Immat GRANS % 1 %      Lymphocytes Relative 21 %      Monocytes Relative 7 %      Eosinophils Relative 1 %      Basophils Relative 0 %      Neutrophils Absolute 8 73 (H) Thousands/µL      Immature Grans Absolute 0 12 Thousand/uL      Lymphocytes Absolute 2 66 Thousands/µL      Monocytes Absolute 0 92 Thousand/µL      Eosinophils Absolute 0 14 Thousand/µL      Basophils Absolute 0 05 Thousands/µL     Fingerstick Glucose (POCT) [[id number]]  (Abnormal) Collected:  08/13/18 1835    Lab Status:  Final result Updated:  08/13/18 1835     POC Glucose 148 (H) mg/dl                  CT abdomen pelvis with contrast    (Results Pending)              Procedures  Procedures       Phone Contacts  ED Phone Contact    ED Course                         Initial Sepsis Screening     [address] Name 08/13/18 0921                Is the patient's history suggestive of a new or worsening infection? (!)  Yes (Proceed)  -GR        Suspected source of infection acute abdominal infection  -GR        Are two or more of the following signs & symptoms of infection both present and new to the patient?  No  -GR        Indicate SIRS criteria Leukocytosis (WBC > 32515 IJL)  -GR If the answer is yes to both questions, suspicion of sepsis is present  --        If severe sepsis is present AND tissue hypoperfusion perists in the hour after fluid resuscitation or lactate > 4, the patient meets criteria for SEPTIC SHOCK  --        Are any of the following organ dysfunction criteria present within 6 hours of suspected infection and SIRS criteria that are NOT considered to be chronic conditions? --        Organ dysfunction  --        Date of presentation of severe sepsis  --        Time of presentation of severe sepsis  --        Tissue hypoperfusion persists in the hour after crystalloid fluid administration, evidenced, by either:  --        Was hypotension present within one hour of the conclusion of crystalloid fluid administration?  --        Date of presentation of septic shock  --        Time of presentation of septic shock  --          User Key  (r) = Recorded By, (t) = Taken By, (c) = Cosigned By    [address] Name Provider Type    Nicki Martinez PA-C Physician Assistant                  MDM  Number of Diagnoses or Management Options  Diverticulitis:   Diagnosis management comments: Differential diagnosis includes but not limited to:  Diverticulitis, diverticulitis with perforation or abscess, kidney stone, hydronephrosis, colitis         Amount and/or Complexity of Data Reviewed  Clinical lab tests: ordered and reviewed  Tests in the radiology section of CPT®: ordered and reviewed      CritCare Time    Disposition  Final diagnoses:   Diverticulitis     Time reflects when diagnosis was documented in both MDM as applicable and the Disposition within this note     Time User Action Codes Description Comment    8/13/2018  9:37 PM Phillip Perez Add [G73 32] Diverticulitis       ED Disposition     ED Disposition Condition Comment    Admit  Case was discussed with PRITI and the patient's admission status was agreed to be Admission Status: inpatient status to the service of Dr Boogie Cotton   Follow-up Information    None         Patient's Medications   Discharge Prescriptions    No medications on file     No discharge procedures on file      ED Provider  Electronically Signed by           Hang Gao PA-C  08/13/18 1242

## 2018-08-14 PROBLEM — K57.92 DIVERTICULITIS: Status: ACTIVE | Noted: 2018-08-14

## 2018-08-14 PROBLEM — E11.9 TYPE 2 DIABETES MELLITUS, WITHOUT LONG-TERM CURRENT USE OF INSULIN (HCC): Chronic | Status: ACTIVE | Noted: 2018-08-14

## 2018-08-14 LAB
ANION GAP SERPL CALCULATED.3IONS-SCNC: 8 MMOL/L (ref 4–13)
BASOPHILS # BLD AUTO: 0.07 THOUSANDS/ΜL (ref 0–0.1)
BASOPHILS NFR BLD AUTO: 1 % (ref 0–1)
BUN SERPL-MCNC: 8 MG/DL (ref 5–25)
CALCIUM SERPL-MCNC: 8.6 MG/DL (ref 8.3–10.1)
CHLORIDE SERPL-SCNC: 103 MMOL/L (ref 100–108)
CO2 SERPL-SCNC: 28 MMOL/L (ref 21–32)
CREAT SERPL-MCNC: 0.84 MG/DL (ref 0.6–1.3)
EOSINOPHIL # BLD AUTO: 0.14 THOUSAND/ΜL (ref 0–0.61)
EOSINOPHIL NFR BLD AUTO: 1 % (ref 0–6)
ERYTHROCYTE [DISTWIDTH] IN BLOOD BY AUTOMATED COUNT: 13.4 % (ref 11.6–15.1)
EST. AVERAGE GLUCOSE BLD GHB EST-MCNC: 157 MG/DL
GFR SERPL CREATININE-BSD FRML MDRD: 81 ML/MIN/1.73SQ M
GLUCOSE SERPL-MCNC: 103 MG/DL (ref 65–140)
GLUCOSE SERPL-MCNC: 124 MG/DL (ref 65–140)
GLUCOSE SERPL-MCNC: 128 MG/DL (ref 65–140)
GLUCOSE SERPL-MCNC: 204 MG/DL (ref 65–140)
GLUCOSE SERPL-MCNC: 96 MG/DL (ref 65–140)
HBA1C MFR BLD: 7.1 % (ref 4.2–6.3)
HCT VFR BLD AUTO: 39.5 % (ref 34.8–46.1)
HGB BLD-MCNC: 13 G/DL (ref 11.5–15.4)
IMM GRANULOCYTES # BLD AUTO: 0.2 THOUSAND/UL (ref 0–0.2)
IMM GRANULOCYTES NFR BLD AUTO: 2 % (ref 0–2)
LYMPHOCYTES # BLD AUTO: 2.21 THOUSANDS/ΜL (ref 0.6–4.47)
LYMPHOCYTES NFR BLD AUTO: 23 % (ref 14–44)
MCH RBC QN AUTO: 29.5 PG (ref 26.8–34.3)
MCHC RBC AUTO-ENTMCNC: 32.9 G/DL (ref 31.4–37.4)
MCV RBC AUTO: 90 FL (ref 82–98)
MONOCYTES # BLD AUTO: 0.71 THOUSAND/ΜL (ref 0.17–1.22)
MONOCYTES NFR BLD AUTO: 7 % (ref 4–12)
NEUTROPHILS # BLD AUTO: 6.38 THOUSANDS/ΜL (ref 1.85–7.62)
NEUTS SEG NFR BLD AUTO: 66 % (ref 43–75)
NRBC BLD AUTO-RTO: 0 /100 WBCS
PLATELET # BLD AUTO: 285 THOUSANDS/UL (ref 149–390)
PMV BLD AUTO: 9.4 FL (ref 8.9–12.7)
POTASSIUM SERPL-SCNC: 3.5 MMOL/L (ref 3.5–5.3)
RBC # BLD AUTO: 4.4 MILLION/UL (ref 3.81–5.12)
SODIUM SERPL-SCNC: 139 MMOL/L (ref 136–145)
WBC # BLD AUTO: 9.71 THOUSAND/UL (ref 4.31–10.16)

## 2018-08-14 PROCEDURE — 99222 1ST HOSP IP/OBS MODERATE 55: CPT | Performed by: SURGERY

## 2018-08-14 PROCEDURE — 99223 1ST HOSP IP/OBS HIGH 75: CPT | Performed by: INTERNAL MEDICINE

## 2018-08-14 PROCEDURE — 80048 BASIC METABOLIC PNL TOTAL CA: CPT | Performed by: PHYSICIAN ASSISTANT

## 2018-08-14 PROCEDURE — 82948 REAGENT STRIP/BLOOD GLUCOSE: CPT

## 2018-08-14 PROCEDURE — 83036 HEMOGLOBIN GLYCOSYLATED A1C: CPT | Performed by: INTERNAL MEDICINE

## 2018-08-14 PROCEDURE — 85025 COMPLETE CBC W/AUTO DIFF WBC: CPT | Performed by: PHYSICIAN ASSISTANT

## 2018-08-14 RX ADMIN — SODIUM CHLORIDE 125 ML/HR: 0.9 INJECTION, SOLUTION INTRAVENOUS at 04:00

## 2018-08-14 RX ADMIN — MORPHINE SULFATE 1 MG: 2 INJECTION, SOLUTION INTRAMUSCULAR; INTRAVENOUS at 21:33

## 2018-08-14 RX ADMIN — PIPERACILLIN SODIUM,TAZOBACTAM SODIUM 3.38 G: 3; .375 INJECTION, POWDER, FOR SOLUTION INTRAVENOUS at 12:24

## 2018-08-14 RX ADMIN — ATORVASTATIN CALCIUM 40 MG: 40 TABLET, FILM COATED ORAL at 17:12

## 2018-08-14 RX ADMIN — DOCUSATE SODIUM 100 MG: 100 CAPSULE, LIQUID FILLED ORAL at 17:12

## 2018-08-14 RX ADMIN — INSULIN LISPRO 2 UNITS: 100 INJECTION, SOLUTION INTRAVENOUS; SUBCUTANEOUS at 21:29

## 2018-08-14 RX ADMIN — ENOXAPARIN SODIUM 40 MG: 40 INJECTION SUBCUTANEOUS at 08:24

## 2018-08-14 RX ADMIN — ACETAMINOPHEN 650 MG: 325 TABLET, FILM COATED ORAL at 15:19

## 2018-08-14 RX ADMIN — MORPHINE SULFATE 1 MG: 2 INJECTION, SOLUTION INTRAMUSCULAR; INTRAVENOUS at 08:24

## 2018-08-14 RX ADMIN — SODIUM CHLORIDE 125 ML/HR: 0.9 INJECTION, SOLUTION INTRAVENOUS at 21:24

## 2018-08-14 RX ADMIN — FLUOXETINE 40 MG: 20 CAPSULE ORAL at 08:24

## 2018-08-14 RX ADMIN — PIPERACILLIN SODIUM,TAZOBACTAM SODIUM 3.38 G: 3; .375 INJECTION, POWDER, FOR SOLUTION INTRAVENOUS at 21:28

## 2018-08-14 RX ADMIN — PIPERACILLIN SODIUM,TAZOBACTAM SODIUM 3.38 G: 3; .375 INJECTION, POWDER, FOR SOLUTION INTRAVENOUS at 17:09

## 2018-08-14 RX ADMIN — MORPHINE SULFATE 1 MG: 2 INJECTION, SOLUTION INTRAMUSCULAR; INTRAVENOUS at 02:05

## 2018-08-14 RX ADMIN — PIPERACILLIN SODIUM,TAZOBACTAM SODIUM 3.38 G: 3; .375 INJECTION, POWDER, FOR SOLUTION INTRAVENOUS at 04:30

## 2018-08-14 NOTE — PLAN OF CARE
Problem: DISCHARGE PLANNING - CARE MANAGEMENT  Goal: Discharge to post-acute care or home with appropriate resources  INTERVENTIONS:  - Conduct assessment to determine patient/family and health care team treatment goals, and need for post-acute services based on payer coverage, community resources, and patient preferences, and barriers to discharge  - Address psychosocial, clinical, and financial barriers to discharge as identified in assessment in conjunction with the patient/family and health care team  - Arrange appropriate level of post-acute services according to patients   needs and preference and payer coverage in collaboration with the physician and health care team  - Communicate with and update the patient/family, physician, and health care team regarding progress on the discharge plan  - Arrange appropriate transportation to post-acute venues  Outcome: Progressing  LOS: 1 CM met with pt at bedside  Pt lives in [facility] [place] [facility] with her daughter and son in law  Pt reports it is a 2 story home that has 3 andi with railings  Pt denies DME and can ambulate independently  Pt denies hx of rehab and [address]  Pt uses [facility]  Pt has hx of depression and reports she sees PCP for medication management  Pt's daughter, Yesi Jose is POA and she does not have AD, pt declined info  Pt is unemployed and drives  CM reviewed discharge planning process including the following: identifying help at home, patient preference for discharge planning needs, pharmacy preference, and availability of treatment team to discuss questions or concerns patient and/or family may have regarding understanding medications and recognizing signs and symptoms once discharged  CM also encouraged patient to follow up with all recommended appointments after discharge  Patient advised of importance for patient and family to participate in managing patients medical well being  CM name and role reviewed   Discharge Checklist reviewed and CM will continue to monitor for progress toward discharge goals in nursing and provider rounds  Pt has no needs at this time   Department to follow pt through dc

## 2018-08-14 NOTE — SOCIAL WORK
LOS: 1 CM met with pt at bedside  Pt lives in [facility] [place] with her daughter and son in law  Pt reports it is a 2 story home that has 3 andi with railings  Pt denies DME and can ambulate independently  Pt denies hx of rehab and [address]  Pt uses [facility]  Pt has hx of depression and reports she sees PCP for medication management  Pt's daughter, Ruslan su is POA and she does not have AD, pt declined info  Pt is unemployed and drives  CM reviewed discharge planning process including the following: identifying help at home, patient preference for discharge planning needs, pharmacy preference, and availability of treatment team to discuss questions or concerns patient and/or family may have regarding understanding medications and recognizing signs and symptoms once discharged  CM also encouraged patient to follow up with all recommended appointments after discharge  Patient advised of importance for patient and family to participate in managing patients medical well being  CM name and role reviewed  Discharge Checklist reviewed and CM will continue to monitor for progress toward discharge goals in nursing and provider rounds  Pt has no needs at this time  CM Department to follow pt through dc

## 2018-08-14 NOTE — ASSESSMENT & PLAN NOTE
No results found for: HGBA1C    Recent Labs      08/13/18   1835  08/13/18   2304   POCGLU  148*  141*       Blood Sugar Average: Last 72 hrs:  (P) 144 5     Hemoglobin A1c pending  Insulin sliding scale

## 2018-08-14 NOTE — CASE MANAGEMENT
Initial Clinical Review    Admission: Date/Time/Statement: 8/13/18 @ 2139     Orders Placed This Encounter   Procedures    Inpatient Admission (expected length of stay for this patient is greater than two midnights)     Standing Status:   Standing     Number of Occurrences:   1     Order Specific Question:   Admitting Physician     Answer:   Jose Hampton [[id number]]     Order Specific Question:   Level of Care     Answer:   Med Surg [[id number]]     Order Specific Question:   Estimated length of stay     Answer:   More than 2 Midnights     Order Specific Question:   Certification     Answer:   I certify that inpatient services are medically necessary for this patient for a duration of greater than two midnights  See H&P and MD Progress Notes for additional information about the patient's course of treatment  ED: Date/Time/Mode of Arrival:   ED Arrival Information     Expected Arrival Acuity Means of Arrival Escorted By Service Admission Type    - 8/13/2018 18:13 Urgent Ambulance [facility] Urgent    Arrival Complaint    -          Chief Complaint:   Chief Complaint   Patient presents with    Abdominal Pain     pt co of LLQ abd pain onset 4 days ago, most w/ movememt  hx of derviticulitis, -v/d/f  + nausea  can only tolerate sips of water        History of Illness:    Sushma Villalobos is a 47 y o  female who presents with complaints of abdominal pain that has been present since Friday  Patient states she was diagnosed with diverticulitis in June, she was treated outpatient  States that she never completed her oral Cipro and Flagyl at that time  She states on Friday which began having abdominal pain again she resumed the previous prescription of Cipro Flagyl she had been prescribed  Patient states she has been having increasing pain despite this        ED Vital Signs:   ED Triage Vitals   Temperature Pulse Respirations Blood Pressure SpO2   08/13/18 1821 08/13/18 1821 08/13/18 1821 08/13/18 1821 08/13/18 1821   99 °F (37 2 °C) 105 19 122/78 95 %      Temp Source Heart Rate Source Patient Position - Orthostatic VS BP Location FiO2 (%)   08/13/18 1821 08/13/18 1821 08/13/18 1821 08/13/18 1821 --   Oral Monitor Sitting Right arm       Pain Score       08/13/18 1817       Worst Possible Pain        Wt Readings from Last 1 Encounters:   08/13/18 109 kg (239 lb 10 2 oz)       Vital Signs (abnormal): wnl     Abnormal Labs/Diagnostic Test Results: cl 99, gluc 155, alb  3 3, wbc  12 62  CT abd- Worsening sigmoid diverticulitis   Single tiny focus of air is identified along the anterior portion of the proximal sigmoid colon could represent free air from microperforation  Hepatic steatosis and hepatomegaly  ED Treatment:   Medication Administration from 08/13/2018 1813 to 08/13/2018 2231       Date/Time Order Dose Route Action Action by Comments     08/13/2018 2204 sodium chloride 0 9 % bolus 1,000 mL 0 mL Intravenous Stopped Ximena Saez RN      08/13/2018 1836 sodium chloride 0 9 % bolus 1,000 mL 1,000 mL Intravenous [place] [address] Ximena Saez, [address] [place] [address]      08/13/2018 1838 morphine (PF) 4 mg/mL injection 4 mg 4 mg Intravenous Given Ximena Saez RN      08/13/2018 1837 ondansetron (ZOFRAN) injection 4 mg 4 mg Intravenous Given Ximena Saez RN      08/13/2018 2110 iohexol (OMNIPAQUE) 350 MG/ML injection (MULTI-DOSE) 100 mL 100 mL Intravenous Given Tory Lauren      08/13/2018 2143 fentanyl citrate (PF) 100 MCG/2ML 50 mcg 50 mcg Intravenous Given Ximena Saez RN      08/13/2018 2202 piperacillin-tazobactam (ZOSYN) 3 375 g in sodium chloride 0 9 % 50 mL IVPB 3 375 g Intravenous New Bag Ximena Saez RN           Past Medical/Surgical History:    Active Ambulatory Problems     Diagnosis Date Noted    No Active Ambulatory Problems     Resolved Ambulatory Problems     Diagnosis Date Noted    No Resolved Ambulatory Problems     Past Medical History:   Diagnosis Date    [facility] [address] [facility] )     [email or web address]     Diverticulitis     Hyperlipidemia     Renal disorder        Admitting Diagnosis: Diverticulitis [K57 92]  Abdominal pain [R10 9]    Age/Sex: 47 y o  female    Assessment/Plan:        * Diverticulitis   Assessment & Plan     Admit  Pain control  Continue Zosyn IV  Consult General surgery        Type 2 diabetes mellitus, without long-term current use of insulin [facility])   Assessment & Plan     No results found for: HGBA1C        Recent Labs      08/13/18   1835  08/13/18   2304   POCGLU  148*  141*    Blood Sugar Average: Last 72 hrs:  (P) 144  5    Hemoglobin A1c pending  Insulin sliding scale            VTE Prophylaxis: Enoxaparin (Lovenox)  / sequential compression device   Code Status:  Full  POLST: There is no POLST form on file for this patient (pre-hospital)  Discussion with family:  There is no family present for discussion   Anticipated Length of Stay:  Patient will be admitted on an Inpatient basis with an anticipated length of stay of  More than 2 midnights     Justification for Hospital Stay:  IV antibiotics     Admission Orders:  Scheduled Meds:   Current Facility-Administered Medications:  acetaminophen 650 mg Oral Q6H PRN TAINA Dumont-PADMINI    atorvastatin 40 mg Oral Daily With [employer] , PA-C    docusate sodium 100 mg Oral BID TAINA Dumont-C    enoxaparin 40 mg Subcutaneous Daily Ken Hoffman, PA-C    FLUoxetine 40 mg Oral Daily Ken Hoffman PA-C    insulin lispro 1-6 Units Subcutaneous TID AC Ken Hoffman, PA-C    insulin lispro 1-6 Units Subcutaneous HS TAINA Dumont-C    morphine injection 1 mg Intravenous Q3H PRN TAINA Dumont-C    ondansetron 4 mg Intravenous Q6H PRN Ken Hoffman, PA-C    piperacillin-tazobactam 3 375 g Intravenous Q6H Ken Hoffman PA-C Last Rate: 3 375 g (08/14/18 0430)   sodium chloride 125 mL/hr Intravenous Continuous TAINA Dumont-PADMINI Last Rate: 125 mL/hr (08/14/18 0400)     Continuous Infusions: sodium chloride 125 mL/hr Last Rate: 125 mL/hr (08/14/18 0400)     PRN Meds:   acetaminophen    morphine injection   x2    ondansetron    Fingerstick ac and hs   NPO   Act as nathan   SCD  Acute care sx consult   Up w/ assist   8/14  BMP , CBC , hgb a1c    hgb a1c   7 1    IM note 8/14  * Diverticulitis   Assessment & Plan     Admit  Pain control  Continue Zosyn IV  Consult General surgery        Type 2 diabetes mellitus, without long-term current use of insulin [facility])   Assessment & Plan     No results found for: HGBA1C        Recent Labs      08/13/18   1835  08/13/18   2304   POCGLU  148*  141*    Blood Sugar Average: Last 72 hrs:  (P) 144  5    Hemoglobin A1c pending  Insulin sliding scale            VTE Prophylaxis: Enoxaparin (Lovenox)  / sequential compression device   Code Status:  Full  POLST: There is no POLST form on file for this patient (pre-hospital)  Discussion with family:  There is no family present for discussion   Anticipated Length of Stay:  Patient will be admitted on an Inpatient basis with an anticipated length of stay of  More than 2 midnights     Justification for Hospital Stay:  IV antibiotics

## 2018-08-14 NOTE — CONSULTS
H&P Exam - General Surgery   Ramona Villegas 47 y o  female MRN: [id number]  Unit/Bed#: -01 Encounter: [id number]    Assessment/Plan     Assessment:  1  Ramona Villegas is a 47 y o  female with acute diverticulitis with possible microperforation and trace free air         Plan:  1  Continue plan of conservative treatment with IV Zosyn and NPO at this time  If patient improves with conservative treatment, will plan to have patient follow up as an outpatient for elective sigmoid colectomy  Patient will likely need to be optimized prior to any elective surgical intervention  2  If patient fails to improve on conservative treatment, will require surgical intervention this hospitalization  3  Continue IV fluids NS @ 125 cc/hr  4  Pain control PRN  5  Advance diet as tolerated   6  DVT prophylaxis    History of Present Illness     HPI:  Ramona Villegas is a 47 y o  female who presents with acute diverticulitis  According to patient, she has had innumerable episodes of diverticulitis over the last several months since initial diagnosis  She states that with every episode, she was managed medically with PO or IV antibiotics  She states that she does not finish her course of PO antibiotics as she feels she no longer needed them after her pain resolved  She states her most recent episode started last Thursday, and she did take old antibiotics from a previous treatment course  When symptoms failed to resolve, she went to the ED for evaluation  CT of her abdomen and pelvis showed worsening diverticulitis  She was admitted and general surgery was consulted  Current lesion states her abdominal pain has improved slightly since admission  She denies any fevers chills chest pain or shortness of breath  She states her last bowel movement was on Thursday but has passed small mucousy amounts of stool since admission  She denies any nausea or vomiting  She has not had a colonoscopy   She states she was scheduled for colonoscopy later in August   She states she has never seen a surgeon regarding her diverticulitis  She states that she has been seen at multiple different hospitals between [facility] and [place] for her diverticulitis  Past surgical history includes nephrectomy, and appendectomy  Medical history includes diabetes  Review of Systems   Constitutional: Negative for activity change, appetite change, fatigue, fever and unexpected weight change  HENT: Negative for congestion, tinnitus and voice change  Eyes: Negative for photophobia, discharge and visual disturbance  Respiratory: Negative for apnea, chest tightness, shortness of breath, wheezing and stridor  Cardiovascular: Negative for chest pain and leg swelling  Gastrointestinal: Positive for abdominal pain  Negative for abdominal distention, constipation, diarrhea, nausea, rectal pain and vomiting  Endocrine: Negative for cold intolerance, polydipsia, polyphagia and polyuria  Genitourinary: Negative for decreased urine volume, difficulty urinating, dysuria, flank pain, hematuria, pelvic pain and urgency  Musculoskeletal: Negative for back pain, neck pain and neck stiffness  Skin: Negative for color change, pallor, rash and wound  Neurological: Negative for dizziness, tremors, syncope and weakness  Historical Information   Past Medical History:   Diagnosis Date    Cancer ([facility] )     r kidney removed     Diabetes mellitus ([facility] )     Diverticulitis     Hyperlipidemia     Renal disorder      Past Surgical History:   Procedure Laterality Date    NEPHRECTOMY      NEPHRECTOMY Right      Social History   History   Alcohol Use No     History   Drug Use No     History   Smoking Status    Current Every Day Smoker    Types: Cigarettes   Smokeless Tobacco    Never Used     Family History: History reviewed  No pertinent family history      Meds/Allergies   all medications and allergies reviewed  No Known Allergies    Objective   First Vitals:   Blood Pressure: 122/78 (08/13/18 1821)  Pulse: 105 (08/13/18 1821)  Temperature: 99 °F (37 2 °C) (08/13/18 1821)  Temp Source: Oral (08/13/18 1821)  Respirations: 19 (08/13/18 1821)  Height: 5' 1" (154 9 cm) (08/13/18 2239)  Weight - Scale: 109 kg (239 lb 10 2 oz) (08/13/18 2239)  SpO2: 95 % (08/13/18 1821)    Current Vitals:   Blood Pressure: 132/79 (08/14/18 0700)  Pulse: 82 (08/14/18 0700)  Temperature: 98 6 °F (37 °C) (08/14/18 0700)  Temp Source: Oral (08/14/18 0700)  Respirations: 18 (08/14/18 0700)  Height: 5' 1" (154 9 cm) (08/13/18 2239)  Weight - Scale: 109 kg (239 lb 10 2 oz) (08/13/18 2239)  SpO2: 90 % (08/14/18 0700)      Intake/Output Summary (Last 24 hours) at 08/14/18 1349  Last data filed at 08/14/18 0501   Gross per 24 hour   Intake             2000 ml   Output              500 ml   Net             1500 ml       Invasive Devices     Peripheral Intravenous Line            Peripheral IV 08/13/18 Right Antecubital less than 1 day    Peripheral IV 08/14/18 Left Arm less than 1 day                Physical Exam   Constitutional: She is oriented to person, place, and time  She appears well-developed and well-nourished  No distress  HENT:   Head: Normocephalic and atraumatic  Right Ear: External ear normal    Left Ear: External ear normal    Mouth/Throat: Oropharynx is clear and moist  No oropharyngeal exudate  Eyes: Conjunctivae and EOM are normal  Pupils are equal, round, and reactive to light  Neck: Normal range of motion  Neck supple  No tracheal deviation present  No thyromegaly present  Cardiovascular: Normal rate, regular rhythm, normal heart sounds and intact distal pulses  Exam reveals no gallop and no friction rub  No murmur heard  Pulmonary/Chest: Effort normal and breath sounds normal  No respiratory distress  She has no wheezes  She has no rales  She exhibits no tenderness  Abdominal: Soft  Bowel sounds are normal  She exhibits no distension  There is tenderness  There is guarding  There is no rebound  Soft, obese, active bowel sounds, nondistended, acutely tender to palpation in left lower quadrant with guarding   Musculoskeletal: Normal range of motion  She exhibits no edema, tenderness or deformity  Neurological: She is oriented to person, place, and time  She has normal reflexes  No cranial nerve deficit  Coordination normal    Skin: Skin is warm and dry  No rash noted  She is not diaphoretic  No erythema  No pallor  Lab Results: I have personally reviewed pertinent lab results      Recent Results (from the past 36 hour(s))   Fingerstick Glucose (POCT)    Collection Time: 08/13/18  6:35 PM   Result Value Ref Range    POC Glucose 148 (H) 65 - 140 mg/dl   CBC and differential    Collection Time: 08/13/18  6:36 PM   Result Value Ref Range    WBC 12 62 (H) 4 31 - 10 16 Thousand/uL    RBC 4 87 3 81 - 5 12 Million/uL    Hemoglobin 14 6 11 5 - 15 4 g/dL    Hematocrit 42 9 34 8 - 46 1 %    MCV 88 82 - 98 fL    MCH 30 0 26 8 - 34 3 pg    MCHC 34 0 31 4 - 37 4 g/dL    RDW 13 5 11 6 - 15 1 %    MPV 9 7 8 9 - 12 7 fL    Platelets 348 835 - 170 Thousands/uL    nRBC 0 /100 WBCs    Neutrophils Relative 70 43 - 75 %    Immat GRANS % 1 0 - 2 %    Lymphocytes Relative 21 14 - 44 %    Monocytes Relative 7 4 - 12 %    Eosinophils Relative 1 0 - 6 %    Basophils Relative 0 0 - 1 %    Neutrophils Absolute 8 73 (H) 1 85 - 7 62 Thousands/µL    Immature Grans Absolute 0 12 0 00 - 0 20 Thousand/uL    Lymphocytes Absolute 2 66 0 60 - 4 47 Thousands/µL    Monocytes Absolute 0 92 0 17 - 1 22 Thousand/µL    Eosinophils Absolute 0 14 0 00 - 0 61 Thousand/µL    Basophils Absolute 0 05 0 00 - 0 10 Thousands/µL   Comprehensive metabolic panel    Collection Time: 08/13/18  6:36 PM   Result Value Ref Range    Sodium 136 136 - 145 mmol/L    Potassium 3 6 3 5 - 5 3 mmol/L    Chloride 99 (L) 100 - 108 mmol/L    CO2 31 21 - 32 mmol/L    Anion Gap 6 4 - 13 mmol/L    BUN 9 5 - 25 mg/dL    Creatinine 0 92 0 60 - 1 30 mg/dL Glucose 155 (H) 65 - 140 mg/dL    Calcium 9 5 8 3 - 10 1 mg/dL    AST 16 5 - 45 U/L    ALT 24 12 - 78 U/L    Alkaline Phosphatase 71 46 - 116 U/L    Total Protein 8 1 6 4 - 8 2 g/dL    Albumin 3 3 (L) 3 5 - 5 0 g/dL    Total Bilirubin 0 60 0 20 - 1 00 mg/dL    eGFR 72 ml/min/1 73sq m   Lipase    Collection Time: 08/13/18  6:36 PM   Result Value Ref Range    Lipase 224 73 - 393 u/L   UA w Reflex to Microscopic w Reflex to Culture    Collection Time: 08/13/18  7:21 PM   Result Value Ref Range    Color, UA Isa     Clarity, UA Clear     Specific Gravity, UA >=1 030 1 003 - 1 030    pH, UA 6 0 4 5 - 8 0    Leukocytes, UA Negative Negative    Nitrite, UA Negative Negative    Protein, UA >=300 (A) Negative mg/dl    Glucose,  (1/10%) (A) Negative mg/dl    Ketones, UA Negative Negative mg/dl    Urobilinogen, UA 2 0 (A) 0 2, 1 0 E U /dl E U /dl    Bilirubin, UA Small (A) Negative    Blood, UA Small (A) Negative   Urine Microscopic    Collection Time: 08/13/18  7:21 PM   Result Value Ref Range    RBC, UA 0-1 (A) None Seen, 0-5 /hpf    WBC, UA 0-1 (A) None Seen, 0-5, 5-55, 5-65 /hpf    Epithelial Cells Moderate (A) None Seen, Occasional /hpf    Bacteria, UA Occasional None Seen, Occasional /hpf   Fingerstick Glucose (POCT)    Collection Time: 08/13/18 11:04 PM   Result Value Ref Range    POC Glucose 141 (H) 65 - 140 mg/dl   Basic metabolic panel    Collection Time: 08/14/18  4:54 AM   Result Value Ref Range    Sodium 139 136 - 145 mmol/L    Potassium 3 5 3 5 - 5 3 mmol/L    Chloride 103 100 - 108 mmol/L    CO2 28 21 - 32 mmol/L    Anion Gap 8 4 - 13 mmol/L    BUN 8 5 - 25 mg/dL    Creatinine 0 84 0 60 - 1 30 mg/dL    Glucose 128 65 - 140 mg/dL    Calcium 8 6 8 3 - 10 1 mg/dL    eGFR 81 ml/min/1 73sq m   CBC and differential    Collection Time: 08/14/18  4:54 AM   Result Value Ref Range    WBC 9 71 4 31 - 10 16 Thousand/uL    RBC 4 40 3 81 - 5 12 Million/uL    Hemoglobin 13 0 11 5 - 15 4 g/dL    Hematocrit 39 5 34 8 - 46 1 %    MCV 90 82 - 98 fL    MCH 29 5 26 8 - 34 3 pg    MCHC 32 9 31 4 - 37 4 g/dL    RDW 13 4 11 6 - 15 1 %    MPV 9 4 8 9 - 12 7 fL    Platelets 895 499 - 631 Thousands/uL    nRBC 0 /100 WBCs    Neutrophils Relative 66 43 - 75 %    Immat GRANS % 2 0 - 2 %    Lymphocytes Relative 23 14 - 44 %    Monocytes Relative 7 4 - 12 %    Eosinophils Relative 1 0 - 6 %    Basophils Relative 1 0 - 1 %    Neutrophils Absolute 6 38 1 85 - 7 62 Thousands/µL    Immature Grans Absolute 0 20 0 00 - 0 20 Thousand/uL    Lymphocytes Absolute 2 21 0 60 - 4 47 Thousands/µL    Monocytes Absolute 0 71 0 17 - 1 22 Thousand/µL    Eosinophils Absolute 0 14 0 00 - 0 61 Thousand/µL    Basophils Absolute 0 07 0 00 - 0 10 Thousands/µL   Hemoglobin A1c w/EAG Estimation (Orders if not completed within the last 90 days)    Collection Time: 08/14/18  4:54 AM   Result Value Ref Range    Hemoglobin A1C 7 1 (H) 4 2 - 6 3 %     mg/dl   Fingerstick Glucose (POCT)    Collection Time: 08/14/18  5:30 AM   Result Value Ref Range    POC Glucose 124 65 - 140 mg/dl     Imaging: I have personally reviewed pertinent reports  Ct Abdomen Pelvis With Contrast    Result Date: 8/13/2018  Impression: Worsening sigmoid diverticulitis  Single tiny focus of air is identified along the anterior portion of the proximal sigmoid colon could represent free air from microperforation  Hepatic steatosis and hepatomegaly  I personally discussed this study with BRENNON Matt on 8/13/2018 at 9:31 PM  Workstation performed: [id number]     EKG, Pathology, and Other Studies: I have personally reviewed pertinent reports

## 2018-08-14 NOTE — H&P
H&P- Jadyn Jaimes 1965, 47 y o  female MRN: [id number]    Unit/Bed#: -01 Encounter: [id number]    Primary Care Provider: Kimberly Dhillon DO   Date and time admitted to hospital: 8/13/2018  6:14 PM        * Diverticulitis   Assessment & Plan    Admit  Pain control  Continue Zosyn IV  Consult General surgery        Type 2 diabetes mellitus, without long-term current use of insulin ([address] )   Assessment & Plan    No results found for: HGBA1C    Recent Labs      08/13/18   1835  08/13/18   2304   POCGLU  148*  141*       Blood Sugar Average: Last 72 hrs:  (P) 144 5     Hemoglobin A1c pending  Insulin sliding scale               VTE Prophylaxis: Enoxaparin (Lovenox)  / sequential compression device   Code Status:  Full  POLST: There is no POLST form on file for this patient (pre-hospital)  Discussion with family:  There is no family present for discussion    Anticipated Length of Stay:  Patient will be admitted on an Inpatient basis with an anticipated length of stay of  More than 2 midnights  Justification for Hospital Stay:  IV antibiotics    Total Time for Visit, including Counseling / Coordination of Care: 30 minutes  Greater than 50% of this total time spent on direct patient counseling and coordination of care  Chief Complaint:  Abdominal pain    History of Present Illness:    Jadyn Jaimes is a 47 y o  female who presents with complaints of abdominal pain that has been present since Friday  Patient states she was diagnosed with diverticulitis in June, she was treated outpatient  States that she never completed her oral Cipro and Flagyl at that time  She states on Friday which began having abdominal pain again she resumed the previous prescription of Cipro Flagyl she had been prescribed  Patient states she has been having increasing pain despite this  She denies any nausea, vomiting, fevers, diarrhea  She denies chest pain or shortness of breath      Review of Systems:    Review of Systems Gastrointestinal: Positive for abdominal pain  All other systems reviewed and are negative  Past Medical and Surgical History:     Past Medical History:   Diagnosis Date    Cancer ([facility] )     r kidney removed     [facility]     Diverticulitis     Hyperlipidemia     Renal disorder        Past Surgical History:   Procedure Laterality Date    NEPHRECTOMY      NEPHRECTOMY Right        Meds/Allergies:    Prior to Admission medications    Medication Sig Start Date End Date Taking? Authorizing Provider   atorvastatin (LIPITOR) 40 mg tablet Take by mouth 8/9/18  Yes Historical Provider, MD   FLUoxetine (PROzac) 40 MG capsule Take by mouth 8/9/18  Yes Historical Provider, MD   metFORMIN (GLUCOPHAGE-XR) 500 mg 24 hr tablet Take by mouth 6/11/18  Yes Historical Provider, MD   varenicline (CHANTIX) 1 mg tablet Take by mouth 8/9/18  Yes Historical Provider, MD     I have reviewed home medications with patient personally  Allergies: No Known Allergies    Social History:     Marital Status: Single   Occupation: unemployed  Patient Pre-hospital Living Situation: lives at home  Patient Pre-hospital Level of Mobility: ambulatory  Patient Pre-hospital Diet Restrictions: diabetic  Substance Use History:   History   Alcohol Use No     History   Smoking Status    Current Every Day Smoker    Types: Cigarettes   Smokeless Tobacco    Never Used     History   Drug Use No       Family History:    History reviewed  No pertinent family history  Physical Exam:     Vitals:   Blood Pressure: 99/59 (08/13/18 2239)  Pulse: 80 (08/13/18 2239)  Temperature: 98 4 °F (36 9 °C) (08/13/18 2239)  Temp Source: Oral (08/13/18 2239)  Respirations: 18 (08/13/18 2239)  Height: 5' 1" (154 9 cm) (08/13/18 2239)  Weight - Scale: 109 kg (239 lb 10 2 oz) (08/13/18 2239)  SpO2: 93 % (08/13/18 2239)    Physical Exam   Constitutional: She is oriented to person, place, and time  She appears well-developed and well-nourished     HENT: Head: Normocephalic and atraumatic  Right Ear: External ear normal    Left Ear: External ear normal    Nose: Nose normal    Mouth/Throat: Oropharynx is clear and moist    Eyes: Conjunctivae and EOM are normal  Pupils are equal, round, and reactive to light  Neck: Normal range of motion  Cardiovascular: Normal rate, regular rhythm and normal heart sounds  Pulmonary/Chest: Effort normal and breath sounds normal    Abdominal: Soft  Bowel sounds are normal  There is tenderness in the left lower quadrant  Musculoskeletal: Normal range of motion  She exhibits no edema  Neurological: She is alert and oriented to person, place, and time  Skin: Skin is warm and dry  Psychiatric: She has a normal mood and affect  Her behavior is normal  Judgment and thought content normal    Vitals reviewed  Additional Data:     Lab Results: I have personally reviewed pertinent reports  Results from last 7 days  Lab Units 08/13/18  1836   WBC Thousand/uL 12 62*   HEMOGLOBIN g/dL 14 6   HEMATOCRIT % 42 9   PLATELETS Thousands/uL 326   NEUTROS PCT % 70   LYMPHS PCT % 21   MONOS PCT % 7   EOS PCT % 1       Results from last 7 days  Lab Units 08/13/18  1836   SODIUM mmol/L 136   POTASSIUM mmol/L 3 6   CHLORIDE mmol/L 99*   CO2 mmol/L 31   BUN mg/dL 9   CREATININE mg/dL 0 92   CALCIUM mg/dL 9 5   TOTAL PROTEIN g/dL 8 1   BILIRUBIN TOTAL mg/dL 0 60   ALK PHOS U/L 71   ALT U/L 24   AST U/L 16   GLUCOSE RANDOM mg/dL 155*           Results from last 7 days  Lab Units 08/13/18  2304 08/13/18  1835   POC GLUCOSE mg/dl 141* 148*           Imaging: I have personally reviewed pertinent reports  CT abdomen pelvis with contrast   Final Result by Carola Panda MD (08/13 2132)      Worsening sigmoid diverticulitis  Single tiny focus of air is identified along the anterior portion of the proximal sigmoid colon could represent free air from microperforation  Hepatic steatosis and hepatomegaly               I personally discussed this study with BRENNON Quiroz on 8/13/2018 at 9:31 PM                      Workstation performed: [id number]             EKG, Pathology, and Other Studies Reviewed on Admission:   · EKG:     Allscripts / Epic Records Reviewed: Yes     ** Please Note: This note has been constructed using a voice recognition system   **

## 2018-08-14 NOTE — ED NOTES
Phone # to contact the daughter José Antonio Thomas [phone]     Meli Ferreira RN  08/13/18 1695

## 2018-08-14 NOTE — SEPSIS NOTE
Sepsis Note   Nuha Buitrago 47 y o  female MRN: [id number]  Unit/Bed#: ED 11 Encounter: [id number]            Initial Sepsis Screening     Row Name 08/13/18 1424                Is the patient's history suggestive of a new or worsening infection? (!)  Yes (Proceed)  -GR        Suspected source of infection acute abdominal infection  -GR        Are two or more of the following signs & symptoms of infection both present and new to the patient? No  -GR        Indicate SIRS criteria Leukocytosis (WBC > 19481 IJL)  -GR        If the answer is yes to both questions, suspicion of sepsis is present  --        If severe sepsis is present AND tissue hypoperfusion perists in the hour after fluid resuscitation or lactate > 4, the patient meets criteria for SEPTIC SHOCK  --        Are any of the following organ dysfunction criteria present within 6 hours of suspected infection and SIRS criteria that are NOT considered to be chronic conditions?   --        Organ dysfunction  --        Date of presentation of severe sepsis  --        Time of presentation of severe sepsis  --        Tissue hypoperfusion persists in the hour after crystalloid fluid administration, evidenced, by either:  --        Was hypotension present within one hour of the conclusion of crystalloid fluid administration?  --        Date of presentation of septic shock  --        Time of presentation of septic shock  --          User Key  (r) = Recorded By, (t) = Taken By, (c) = Cosigned By    [address] Name Provider Type    [address], PA-C Physician Assistant

## 2018-08-15 LAB
ANION GAP SERPL CALCULATED.3IONS-SCNC: 9 MMOL/L (ref 4–13)
BASOPHILS # BLD AUTO: 0.05 THOUSANDS/ΜL (ref 0–0.1)
BASOPHILS NFR BLD AUTO: 1 % (ref 0–1)
BUN SERPL-MCNC: 3 MG/DL (ref 5–25)
CALCIUM SERPL-MCNC: 8.5 MG/DL (ref 8.3–10.1)
CHLORIDE SERPL-SCNC: 105 MMOL/L (ref 100–108)
CO2 SERPL-SCNC: 27 MMOL/L (ref 21–32)
CREAT SERPL-MCNC: 0.74 MG/DL (ref 0.6–1.3)
EOSINOPHIL # BLD AUTO: 0.14 THOUSAND/ΜL (ref 0–0.61)
EOSINOPHIL NFR BLD AUTO: 2 % (ref 0–6)
ERYTHROCYTE [DISTWIDTH] IN BLOOD BY AUTOMATED COUNT: 13.4 % (ref 11.6–15.1)
GFR SERPL CREATININE-BSD FRML MDRD: 94 ML/MIN/1.73SQ M
GLUCOSE SERPL-MCNC: 102 MG/DL (ref 65–140)
GLUCOSE SERPL-MCNC: 110 MG/DL (ref 65–140)
GLUCOSE SERPL-MCNC: 112 MG/DL (ref 65–140)
GLUCOSE SERPL-MCNC: 144 MG/DL (ref 65–140)
GLUCOSE SERPL-MCNC: 172 MG/DL (ref 65–140)
HCT VFR BLD AUTO: 35.2 % (ref 34.8–46.1)
HGB BLD-MCNC: 11.6 G/DL (ref 11.5–15.4)
IMM GRANULOCYTES # BLD AUTO: 0.09 THOUSAND/UL (ref 0–0.2)
IMM GRANULOCYTES NFR BLD AUTO: 1 % (ref 0–2)
LYMPHOCYTES # BLD AUTO: 2.18 THOUSANDS/ΜL (ref 0.6–4.47)
LYMPHOCYTES NFR BLD AUTO: 24 % (ref 14–44)
MCH RBC QN AUTO: 29.7 PG (ref 26.8–34.3)
MCHC RBC AUTO-ENTMCNC: 33 G/DL (ref 31.4–37.4)
MCV RBC AUTO: 90 FL (ref 82–98)
MONOCYTES # BLD AUTO: 0.68 THOUSAND/ΜL (ref 0.17–1.22)
MONOCYTES NFR BLD AUTO: 8 % (ref 4–12)
NEUTROPHILS # BLD AUTO: 5.83 THOUSANDS/ΜL (ref 1.85–7.62)
NEUTS SEG NFR BLD AUTO: 64 % (ref 43–75)
NRBC BLD AUTO-RTO: 0 /100 WBCS
PLATELET # BLD AUTO: 273 THOUSANDS/UL (ref 149–390)
PMV BLD AUTO: 9.5 FL (ref 8.9–12.7)
POTASSIUM SERPL-SCNC: 3.3 MMOL/L (ref 3.5–5.3)
RBC # BLD AUTO: 3.9 MILLION/UL (ref 3.81–5.12)
SODIUM SERPL-SCNC: 141 MMOL/L (ref 136–145)
WBC # BLD AUTO: 8.97 THOUSAND/UL (ref 4.31–10.16)

## 2018-08-15 PROCEDURE — 99233 SBSQ HOSP IP/OBS HIGH 50: CPT | Performed by: SURGERY

## 2018-08-15 PROCEDURE — 99222 1ST HOSP IP/OBS MODERATE 55: CPT | Performed by: INTERNAL MEDICINE

## 2018-08-15 PROCEDURE — 80048 BASIC METABOLIC PNL TOTAL CA: CPT | Performed by: INTERNAL MEDICINE

## 2018-08-15 PROCEDURE — 85025 COMPLETE CBC W/AUTO DIFF WBC: CPT | Performed by: INTERNAL MEDICINE

## 2018-08-15 PROCEDURE — 82948 REAGENT STRIP/BLOOD GLUCOSE: CPT

## 2018-08-15 RX ORDER — POTASSIUM CHLORIDE 20 MEQ/1
40 TABLET, EXTENDED RELEASE ORAL ONCE
Status: COMPLETED | OUTPATIENT
Start: 2018-08-15 | End: 2018-08-15

## 2018-08-15 RX ADMIN — PIPERACILLIN SODIUM,TAZOBACTAM SODIUM 3.38 G: 3; .375 INJECTION, POWDER, FOR SOLUTION INTRAVENOUS at 03:22

## 2018-08-15 RX ADMIN — ENOXAPARIN SODIUM 40 MG: 40 INJECTION SUBCUTANEOUS at 10:03

## 2018-08-15 RX ADMIN — PIPERACILLIN SODIUM,TAZOBACTAM SODIUM 3.38 G: 3; .375 INJECTION, POWDER, FOR SOLUTION INTRAVENOUS at 17:16

## 2018-08-15 RX ADMIN — MORPHINE SULFATE 1 MG: 2 INJECTION, SOLUTION INTRAMUSCULAR; INTRAVENOUS at 06:35

## 2018-08-15 RX ADMIN — FLUOXETINE 40 MG: 20 CAPSULE ORAL at 10:02

## 2018-08-15 RX ADMIN — MORPHINE SULFATE 1 MG: 2 INJECTION, SOLUTION INTRAMUSCULAR; INTRAVENOUS at 23:25

## 2018-08-15 RX ADMIN — ATORVASTATIN CALCIUM 40 MG: 40 TABLET, FILM COATED ORAL at 17:14

## 2018-08-15 RX ADMIN — DOCUSATE SODIUM 100 MG: 100 CAPSULE, LIQUID FILLED ORAL at 10:03

## 2018-08-15 RX ADMIN — POTASSIUM CHLORIDE 40 MEQ: 1500 TABLET, EXTENDED RELEASE ORAL at 11:10

## 2018-08-15 RX ADMIN — SODIUM CHLORIDE 125 ML/HR: 0.9 INJECTION, SOLUTION INTRAVENOUS at 23:26

## 2018-08-15 RX ADMIN — PIPERACILLIN SODIUM,TAZOBACTAM SODIUM 3.38 G: 3; .375 INJECTION, POWDER, FOR SOLUTION INTRAVENOUS at 10:05

## 2018-08-15 RX ADMIN — SODIUM CHLORIDE 125 ML/HR: 0.9 INJECTION, SOLUTION INTRAVENOUS at 06:03

## 2018-08-15 RX ADMIN — PIPERACILLIN SODIUM,TAZOBACTAM SODIUM 3.38 G: 3; .375 INJECTION, POWDER, FOR SOLUTION INTRAVENOUS at 21:24

## 2018-08-15 NOTE — H&P
[facility] Progress Note  Patient: Andree Casey 47 y o  female   MRN: [id number]  PCP: Riley Guaman DO  Unit/Bed#: -01 Encounter: [id number]  Date Of Visit: 08/15/18    Assessment:    Principal Problem:    Diverticulitis  Active Problems:    Type 2 diabetes mellitus, without long-term current use of insulin ([address] )      Plan:    · 1  Abdominal pain secondary to diverticulitis- on zosyn  Surgery following  Patient noncompliant with medications at home  She takes her medication for diverticulitis treatment at home for 1-2 days and then stops once she feels better  Patient abdominal pain improving  Will restart diet and advance as tolerated  · 2  DM- on ISS  · 3  Depression- on prozac  · 4  Hypokalemia- repleted       VTE Pharmacologic Prophylaxis:   Pharmacologic: Enoxaparin (Lovenox)  Mechanical VTE Prophylaxis in Place: Yes    Patient Centered Rounds: I have performed bedside rounds with nursing staff today  Discussions with Specialists or Other Care Team Provider:     Education and Discussions with Family / Patient:     Time Spent for Care: 30 minutes  More than 50% of total time spent on counseling and coordination of care as described above  Current Length of Stay: 2 day(s)    Current Patient Status: Inpatient   Certification Statement: The patient will continue to require additional inpatient hospital stay due to diverticulitis    Discharge Plan / Estimated Discharge Date: once abdominal pain improves  Code Status: Level 1 - Full Code      Subjective:   Patient seen and examined at bedside  Patient has no new complaints  Objective:     Vitals:   Temp (24hrs), Av 6 °F (37 °C), Min:98 4 °F (36 9 °C), Max:98 9 °F (37 2 °C)    HR:  [70-82] 70  Resp:  [18] 18  BP: (114-121)/(59-77) 121/62  SpO2:  [91 %-96 %] 96 %  Body mass index is 45 28 kg/m²  Input and Output Summary (last 24 hours):        Intake/Output Summary (Last 24 hours) at 08/15/18 1549  Last data filed at 08/15/18 0603   Gross per 24 hour   Intake             2000 ml   Output                0 ml   Net             2000 ml       Physical Exam:     Physical Exam   Constitutional: She is oriented to person, place, and time  She appears well-developed and well-nourished  HENT:   Head: Normocephalic and atraumatic  Eyes: Conjunctivae and EOM are normal  Pupils are equal, round, and reactive to light  Neck: Normal range of motion  Neck supple  No JVD present  No tracheal deviation present  No thyromegaly present  Cardiovascular: Normal rate, regular rhythm and normal heart sounds  Exam reveals no gallop and no friction rub  No murmur heard  Pulmonary/Chest: Effort normal and breath sounds normal  No respiratory distress  She has no wheezes  She has no rales  Abdominal: Soft  Bowel sounds are normal  She exhibits no distension  There is tenderness  There is no rebound  Musculoskeletal: Normal range of motion  She exhibits no edema  Neurological: She is alert and oriented to person, place, and time  Vitals reviewed  Additional Data:     Labs:      Results from last 7 days  Lab Units 08/15/18  0510   WBC Thousand/uL 8 97   HEMOGLOBIN g/dL 11 6   HEMATOCRIT % 35 2   PLATELETS Thousands/uL 273   NEUTROS PCT % 64   LYMPHS PCT % 24   MONOS PCT % 8   EOS PCT % 2       Results from last 7 days  Lab Units 08/15/18  0510  08/13/18  1836   SODIUM mmol/L 141  < > 136   POTASSIUM mmol/L 3 3*  < > 3 6   CHLORIDE mmol/L 105  < > 99*   CO2 mmol/L 27  < > 31   BUN mg/dL 3*  < > 9   CREATININE mg/dL 0 74  < > 0 92   CALCIUM mg/dL 8 5  < > 9 5   TOTAL PROTEIN g/dL  --   --  8 1   BILIRUBIN TOTAL mg/dL  --   --  0 60   ALK PHOS U/L  --   --  71   ALT U/L  --   --  24   AST U/L  --   --  16   GLUCOSE RANDOM mg/dL 110  < > 155*   < > = values in this interval not displayed  * I Have Reviewed All Lab Data Listed Above  * Additional Pertinent Lab Tests Reviewed:  Edmond Pollard Admission Reviewed    Imaging:    Imaging Reports Reviewed Today Include:   Imaging Personally Reviewed by Myself Includes:      Recent Cultures (last 7 days):           Last 24 Hours Medication List:     Current Facility-Administered Medications:  acetaminophen 650 mg Oral Q6H PRN Paulo Molt, PA-C    atorvastatin 40 mg Oral Daily With [employer] , PA-C    docusate sodium 100 mg Oral BID Paulo Molt, PA-C    enoxaparin 40 mg Subcutaneous Daily Paulo Molt, PA-C    FLUoxetine 40 mg Oral Daily Paulo Molt, PA-C    insulin lispro 1-6 Units Subcutaneous TID AC Paulo Molt, PA-C    insulin lispro 1-6 Units Subcutaneous HS Paulo Molt, PA-C    morphine injection 1 mg Intravenous Q3H PRN Paulo Molt, PA-C    ondansetron 4 mg Intravenous Q6H PRN Paulo Molt, PA-C    piperacillin-tazobactam 3 375 g Intravenous Q6H Paulo Molt, PA-C Last Rate: 3 375 g (08/15/18 1005)   sodium chloride 125 mL/hr Intravenous Continuous Paulo Molt, PA-C Last Rate: 125 mL/hr (08/15/18 0603)        Today, Patient Was Seen By: Hank Jain MD    ** Please Note: This note has been constructed using a voice recognition system   **

## 2018-08-15 NOTE — MALNUTRITION/BMI
This medical record reflects one or more clinical indicators suggestive of malnutrition and/or morbid obesity  BMI Findings:  BMI Classifications: Morbid Obesity 45-49 9     Body mass index is 45 28 kg/m²  See Nutrition note dated 8/15/18 for additional details  Completed nutrition assessment is viewable in the nutrition documentation

## 2018-08-15 NOTE — PROGRESS NOTES
Progress Note -Surgery TA[facility] Toribio Racer 47 y o  female MRN: [id number]  Unit/Bed#: -01 Encounter: [id number]      Assessment   Diverticulitis with possible microperforation  -62-year-old female with recurrent diverticulitis, noncompliant as outpatient therapy  -patient seen is been improving with conservative treatment however today states she does not feel much improved since yesterday, and has required IV pain medication this morning  Still having lot of pain when she is up and moving around or lying flat  Referred tenderness to left lower quadrant palpation of right lower quadrant  Still with pain and guarding focally a left lower quadrant   -leukocytosis is improved from admission, 8 9 today  Patient is afebrile vital signs are stable      Plan   Continue with diet NPO and sips of clears until pain is further improved  Continue with conservative treatment with IV Zosyn and IV fluids  Pain control p r n  DVT prophylaxis  ______________________________________________________________________    Subjective:   Patient is still having abdominal pain  She is much more comfortable when lying still but when moving around her upper came around the pain becomes much worse  She states she does not feel that she has improved much since yesterday but is improved since admission  Denies any fever or chills  Passing flatus  One episode of loose stools today  Objective:       Vitals:  /59 (BP Location: Left arm)   Pulse 75   Temp 98 4 °F (36 9 °C) (Oral)   Resp 18   Ht 5' 1" (1 549 m)   Wt 109 kg (239 lb 10 2 oz)   SpO2 91%   BMI 45 28 kg/m²     I/Os:  I/O last 3 completed shifts: In: 4000 [I V :3000; IV Piggyback:1000]  Out: 500 [Urine:500]    No intake/output data recorded      Invasive Devices     Peripheral Intravenous Line            Peripheral IV 08/13/18 Right Antecubital 1 day    Peripheral IV 08/14/18 Left Arm 1 day                Medications:  Current Facility-Administered Medications Medication Dose Route Frequency    acetaminophen (TYLENOL) tablet 650 mg  650 mg Oral Q6H PRN    atorvastatin (LIPITOR) tablet 40 mg  40 mg Oral Daily With Dinner    docusate sodium (COLACE) capsule 100 mg  100 mg Oral BID    enoxaparin (LOVENOX) subcutaneous injection 40 mg  40 mg Subcutaneous Daily    FLUoxetine (PROzac) capsule 40 mg  40 mg Oral Daily    insulin lispro (HumaLOG) 100 units/mL subcutaneous injection 1-6 Units  1-6 Units Subcutaneous TID AC    insulin lispro (HumaLOG) 100 units/mL subcutaneous injection 1-6 Units  1-6 Units Subcutaneous HS    morphine injection 1 mg  1 mg Intravenous Q3H PRN    ondansetron (ZOFRAN) injection 4 mg  4 mg Intravenous Q6H PRN    piperacillin-tazobactam (ZOSYN) 3 375 g in sodium chloride 0 9 % 50 mL IVPB  3 375 g Intravenous Q6H    potassium chloride (K-DUR,KLOR-CON) CR tablet 40 mEq  40 mEq Oral Once    sodium chloride 0 9 % infusion  125 mL/hr Intravenous Continuous                 Lab Results and Cultures:   CBC with diff:   Lab Results   Component Value Date    WBC 8 97 08/15/2018    HGB 11 6 08/15/2018    HCT 35 2 08/15/2018    MCV 90 08/15/2018     08/15/2018    MCH 29 7 08/15/2018    MCHC 33 0 08/15/2018    RDW 13 4 08/15/2018    MPV 9 5 08/15/2018    NRBC 0 08/15/2018       BMP/CMP:  Lab Results   Component Value Date     08/15/2018    K 3 3 (L) 08/15/2018     08/15/2018    CO2 27 08/15/2018    ANIONGAP 9 08/15/2018    BUN 3 (L) 08/15/2018    CREATININE 0 74 08/15/2018    GLUCOSE 110 08/15/2018    CALCIUM 8 5 08/15/2018    AST 16 08/13/2018    ALT 24 08/13/2018    ALKPHOS 71 08/13/2018    PROT 8 1 08/13/2018    BILITOT 0 60 08/13/2018    EGFR 94 08/15/2018       Lipid Panel:   No results found for: CHOL    Coags:   No results found for: PT, PTT, INR     Urinalysis:   Lab Results   Component Value Date    COLORU Isa 08/13/2018    CLARITYU Clear 08/13/2018    SPECGRAV >=1 030 08/13/2018    PHUR 6 0 08/13/2018    LEUKOCYTESUR Negative 08/13/2018    NITRITE Negative 08/13/2018    PROTEINUA >=300 (A) 08/13/2018    GLUCOSEU 100 (1/10%) (A) 08/13/2018    KETONESU Negative 08/13/2018    BILIRUBINUR Small (A) 08/13/2018    BLOODU Small (A) 08/13/2018        Urine Culture: No results found for: URINECX   Wound Culure: No results found for: WOUNDCULT  Blood Culture: No results found for: BLOODCX      Physical Exam:  General Appearance:    Alert and orientated x 3, cooperative, no distress, appears stated age   Lungs:     Clear to auscultation bilaterally, respirations unlabored, no wheezes    Heart:    Regular rate and rhythm, S1 and S2 normal, no murmur   Abdomen:    Normoactive BS, left lower quadrant tenderness to palpation, referred tenderness to left lower quadrant with palpation of right lower quadrant, + guarding, no palpated organomegaly     Extremities:  Extremities normal, no calf tenderness, no cyanosis or edema   Pulses:   2+ and symmetric all extremities   Skin:   Skin color, texture, turgor normal, no rashes   Neurologic:   CNII-XII intact, normal strength, affect appropriate       Imaging:  Ct Abdomen Pelvis With Contrast    Result Date: 8/13/2018  Impression: Worsening sigmoid diverticulitis  Single tiny focus of air is identified along the anterior portion of the proximal sigmoid colon could represent free air from microperforation  Hepatic steatosis and hepatomegaly    I personally discussed this study with Christiano Flores on 8/13/2018 at 9:31 PM  Workstation performed: [id number]     Zainab Nichols PA-C   8/15/2018

## 2018-08-16 LAB
ANION GAP SERPL CALCULATED.3IONS-SCNC: 9 MMOL/L (ref 4–13)
BASOPHILS # BLD AUTO: 0.06 THOUSANDS/ΜL (ref 0–0.1)
BASOPHILS NFR BLD AUTO: 1 % (ref 0–1)
BUN SERPL-MCNC: 4 MG/DL (ref 5–25)
CALCIUM SERPL-MCNC: 8.8 MG/DL (ref 8.3–10.1)
CHLORIDE SERPL-SCNC: 106 MMOL/L (ref 100–108)
CO2 SERPL-SCNC: 26 MMOL/L (ref 21–32)
CREAT SERPL-MCNC: 0.78 MG/DL (ref 0.6–1.3)
EOSINOPHIL # BLD AUTO: 0.16 THOUSAND/ΜL (ref 0–0.61)
EOSINOPHIL NFR BLD AUTO: 2 % (ref 0–6)
ERYTHROCYTE [DISTWIDTH] IN BLOOD BY AUTOMATED COUNT: 13.3 % (ref 11.6–15.1)
GFR SERPL CREATININE-BSD FRML MDRD: 88 ML/MIN/1.73SQ M
GLUCOSE SERPL-MCNC: 103 MG/DL (ref 65–140)
GLUCOSE SERPL-MCNC: 111 MG/DL (ref 65–140)
GLUCOSE SERPL-MCNC: 118 MG/DL (ref 65–140)
GLUCOSE SERPL-MCNC: 89 MG/DL (ref 65–140)
GLUCOSE SERPL-MCNC: 96 MG/DL (ref 65–140)
HCT VFR BLD AUTO: 35.4 % (ref 34.8–46.1)
HGB BLD-MCNC: 11.6 G/DL (ref 11.5–15.4)
IMM GRANULOCYTES # BLD AUTO: 0.08 THOUSAND/UL (ref 0–0.2)
IMM GRANULOCYTES NFR BLD AUTO: 1 % (ref 0–2)
LYMPHOCYTES # BLD AUTO: 2.23 THOUSANDS/ΜL (ref 0.6–4.47)
LYMPHOCYTES NFR BLD AUTO: 29 % (ref 14–44)
MCH RBC QN AUTO: 29.5 PG (ref 26.8–34.3)
MCHC RBC AUTO-ENTMCNC: 32.8 G/DL (ref 31.4–37.4)
MCV RBC AUTO: 90 FL (ref 82–98)
MONOCYTES # BLD AUTO: 0.58 THOUSAND/ΜL (ref 0.17–1.22)
MONOCYTES NFR BLD AUTO: 8 % (ref 4–12)
NEUTROPHILS # BLD AUTO: 4.61 THOUSANDS/ΜL (ref 1.85–7.62)
NEUTS SEG NFR BLD AUTO: 59 % (ref 43–75)
NRBC BLD AUTO-RTO: 0 /100 WBCS
PLATELET # BLD AUTO: 292 THOUSANDS/UL (ref 149–390)
PMV BLD AUTO: 9.9 FL (ref 8.9–12.7)
POTASSIUM SERPL-SCNC: 3.4 MMOL/L (ref 3.5–5.3)
RBC # BLD AUTO: 3.93 MILLION/UL (ref 3.81–5.12)
SODIUM SERPL-SCNC: 141 MMOL/L (ref 136–145)
WBC # BLD AUTO: 7.72 THOUSAND/UL (ref 4.31–10.16)

## 2018-08-16 PROCEDURE — 85025 COMPLETE CBC W/AUTO DIFF WBC: CPT | Performed by: INTERNAL MEDICINE

## 2018-08-16 PROCEDURE — 99232 SBSQ HOSP IP/OBS MODERATE 35: CPT | Performed by: SURGERY

## 2018-08-16 PROCEDURE — 82948 REAGENT STRIP/BLOOD GLUCOSE: CPT

## 2018-08-16 PROCEDURE — 99232 SBSQ HOSP IP/OBS MODERATE 35: CPT | Performed by: INTERNAL MEDICINE

## 2018-08-16 PROCEDURE — 80048 BASIC METABOLIC PNL TOTAL CA: CPT | Performed by: INTERNAL MEDICINE

## 2018-08-16 RX ORDER — POTASSIUM CHLORIDE 20 MEQ/1
40 TABLET, EXTENDED RELEASE ORAL ONCE
Status: COMPLETED | OUTPATIENT
Start: 2018-08-16 | End: 2018-08-16

## 2018-08-16 RX ADMIN — MORPHINE SULFATE 1 MG: 2 INJECTION, SOLUTION INTRAMUSCULAR; INTRAVENOUS at 23:43

## 2018-08-16 RX ADMIN — PIPERACILLIN SODIUM,TAZOBACTAM SODIUM 3.38 G: 3; .375 INJECTION, POWDER, FOR SOLUTION INTRAVENOUS at 20:46

## 2018-08-16 RX ADMIN — ATORVASTATIN CALCIUM 40 MG: 40 TABLET, FILM COATED ORAL at 17:14

## 2018-08-16 RX ADMIN — PIPERACILLIN SODIUM,TAZOBACTAM SODIUM 3.38 G: 3; .375 INJECTION, POWDER, FOR SOLUTION INTRAVENOUS at 03:36

## 2018-08-16 RX ADMIN — DOCUSATE SODIUM 100 MG: 100 CAPSULE, LIQUID FILLED ORAL at 17:14

## 2018-08-16 RX ADMIN — MORPHINE SULFATE 1 MG: 2 INJECTION, SOLUTION INTRAMUSCULAR; INTRAVENOUS at 13:45

## 2018-08-16 RX ADMIN — POTASSIUM CHLORIDE 40 MEQ: 1500 TABLET, EXTENDED RELEASE ORAL at 12:54

## 2018-08-16 RX ADMIN — DOCUSATE SODIUM 100 MG: 100 CAPSULE, LIQUID FILLED ORAL at 08:12

## 2018-08-16 RX ADMIN — PIPERACILLIN SODIUM,TAZOBACTAM SODIUM 3.38 G: 3; .375 INJECTION, POWDER, FOR SOLUTION INTRAVENOUS at 13:40

## 2018-08-16 RX ADMIN — FLUOXETINE 40 MG: 20 CAPSULE ORAL at 08:13

## 2018-08-16 RX ADMIN — ENOXAPARIN SODIUM 40 MG: 40 INJECTION SUBCUTANEOUS at 08:12

## 2018-08-16 NOTE — PROGRESS NOTES
Progress Note -Surgery TA[facility] Mgland Aminawilly 47 y o  female MRN: [id number]  Unit/Bed#: -01 Encounter: [id number]      Assessment   Diverticulitis with possible microperforation  -56-year-old female with recurrent diverticulitis  -pain improved today but still the pain with changes in position and a focally tender with guarding at the left lower quadrant   -afebrile, VSS, no leukocytosis    Plan   Continue with NPO and sips of clears, likely advance to CLD later today  Continue IV antibiotics and IV fluids, and  Analgesia, p r n , DVT prophylaxis, encourage out of bed and ambulation    ______________________________________________________________________    Subjective:   Patient states she is hungry but still with abdominal pain, left lower quadrant tenderness and pain with changes in position and walking around  She feels that she has improved today from yesterday  Has not required IV pain medication since last evening  Denies fever, chills, chest pain, shortness of breath    Objective:       Vitals:  /60 (BP Location: Right arm)   Pulse 66   Temp 98 4 °F (36 9 °C) (Oral)   Resp 18   Ht 5' 1" (1 549 m)   Wt 109 kg (239 lb 10 2 oz)   SpO2 96%   BMI 45 28 kg/m²     I/Os:  I/O last 3 completed shifts: In: 3000 [I V :3000]  Out: -     No intake/output data recorded      Invasive Devices     Peripheral Intravenous Line            Peripheral IV 08/13/18 Right Antecubital 2 days                Medications:  Current Facility-Administered Medications   Medication Dose Route Frequency    acetaminophen (TYLENOL) tablet 650 mg  650 mg Oral Q6H PRN    atorvastatin (LIPITOR) tablet 40 mg  40 mg Oral Daily With Dinner    docusate sodium (COLACE) capsule 100 mg  100 mg Oral BID    enoxaparin (LOVENOX) subcutaneous injection 40 mg  40 mg Subcutaneous Daily    FLUoxetine (PROzac) capsule 40 mg  40 mg Oral Daily    insulin lispro (HumaLOG) 100 units/mL subcutaneous injection 1-6 Units  1-6 Units Subcutaneous TID AC    insulin lispro (HumaLOG) 100 units/mL subcutaneous injection 1-6 Units  1-6 Units Subcutaneous HS    morphine injection 1 mg  1 mg Intravenous Q3H PRN    ondansetron (ZOFRAN) injection 4 mg  4 mg Intravenous Q6H PRN    piperacillin-tazobactam (ZOSYN) 3 375 g in sodium chloride 0 9 % 50 mL IVPB  3 375 g Intravenous Q6H    sodium chloride 0 9 % infusion  125 mL/hr Intravenous Continuous                 Lab Results and Cultures:   CBC with diff:   Lab Results   Component Value Date    WBC 7 72 08/16/2018    HGB 11 6 08/16/2018    HCT 35 4 08/16/2018    MCV 90 08/16/2018     08/16/2018    MCH 29 5 08/16/2018    MCHC 32 8 08/16/2018    RDW 13 3 08/16/2018    MPV 9 9 08/16/2018    NRBC 0 08/16/2018       BMP/CMP:  Lab Results   Component Value Date     08/16/2018    K 3 4 (L) 08/16/2018     08/16/2018    CO2 26 08/16/2018    ANIONGAP 9 08/16/2018    BUN 4 (L) 08/16/2018    CREATININE 0 78 08/16/2018    GLUCOSE 118 08/16/2018    CALCIUM 8 8 08/16/2018    AST 16 08/13/2018    ALT 24 08/13/2018    ALKPHOS 71 08/13/2018    PROT 8 1 08/13/2018    BILITOT 0 60 08/13/2018    EGFR 88 08/16/2018           Physical Exam:  General Appearance:    Alert and orientated x 3, cooperative, no distress, appears stated age   Lungs:     Clear to auscultation bilaterally, respirations unlabored, no wheezes    Heart:    Regular rate and rhythm, S1 and S2 normal, no murmur   Abdomen:    Normoactive BS, soft, focal left lower quadrant tenderness and guarding on palpation, non rigid, no masses, no palpated organomegaly   Extremities:  Extremities normal, no calf tenderness, no cyanosis or edema   Pulses:   2+ and symmetric all extremities   Skin:   Skin color, texture, turgor normal, no rashes   Neurologic:   CNII-XII intact, normal strength, affect appropriate       Imaging:  Ct Abdomen Pelvis With Contrast    Result Date: 8/13/2018  Impression: Worsening sigmoid diverticulitis    Single tiny focus of air is identified along the anterior portion of the proximal sigmoid colon could represent free air from microperforation  Hepatic steatosis and hepatomegaly    I personally discussed this study with BRENNON Matt on 8/13/2018 at 9:31 PM  Workstation performed: [id number]       Matthew Marshall PA-C   8/16/2018

## 2018-08-16 NOTE — H&P
[facility] Progress Note  Patient: Chris Baez 47 y o  female   MRN: [id number]  PCP: Tracy Lozano DO  Unit/Bed#: -01 Encounter: [id number]  Date Of Visit: 18    Assessment:    Principal Problem:    Diverticulitis  Active Problems:    Type 2 diabetes mellitus, without long-term current use of insulin ([address] )      Plan:    · 1  Abdominal pain secondary to diverticulitis- on zosyn  Surgery following  Patient noncompliant with medications at home  She takes her medication for diverticulitis treatment at home for 1-2 days and then stops once she feels better  Patient abdominal pain improving  Patient still NPO  Surgery to restart diet later on today  · 2  DM- on ISS  · 3  Depression- on prozac  · 4  Hypokalemia- repleted       VTE Pharmacologic Prophylaxis:   Pharmacologic: Enoxaparin (Lovenox)  Mechanical VTE Prophylaxis in Place: Yes    Patient Centered Rounds: I have performed bedside rounds with nursing staff today  Discussions with Specialists or Other Care Team Provider:     Education and Discussions with Family / Patient:     Time Spent for Care: 30 minutes  More than 50% of total time spent on counseling and coordination of care as described above  Current Length of Stay: 3 day(s)    Current Patient Status: Inpatient   Certification Statement: The patient will continue to require additional inpatient hospital stay due to diverticulitis    Discharge Plan / Estimated Discharge Date: once abdominal pain improves  Code Status: Level 1 - Full Code      Subjective:   Patient seen and examined at bedside  Patient has no new complaints  Objective:     Vitals:   Temp (24hrs), Av 5 °F (36 9 °C), Min:98 4 °F (36 9 °C), Max:98 6 °F (37 °C)    HR:  [66-70] 66  Resp:  [18] 18  BP: (121-138)/(60-82) 124/60  SpO2:  [95 %-96 %] 96 %  Body mass index is 45 28 kg/m²  Input and Output Summary (last 24 hours):        Intake/Output Summary (Last 24 hours) at 08/16/18 1414  Last data filed at 08/15/18 2326   Gross per 24 hour   Intake             1000 ml   Output                0 ml   Net             1000 ml       Physical Exam:     Physical Exam   Constitutional: She is oriented to person, place, and time  She appears well-developed and well-nourished  HENT:   Head: Normocephalic and atraumatic  Eyes: Conjunctivae and EOM are normal  Pupils are equal, round, and reactive to light  Neck: Normal range of motion  Neck supple  No JVD present  No tracheal deviation present  No thyromegaly present  Cardiovascular: Normal rate, regular rhythm and normal heart sounds  Exam reveals no gallop and no friction rub  No murmur heard  Pulmonary/Chest: Effort normal and breath sounds normal  No respiratory distress  She has no wheezes  She has no rales  Abdominal: Soft  Bowel sounds are normal  She exhibits no distension  There is tenderness  There is no rebound  Musculoskeletal: Normal range of motion  She exhibits no edema  Neurological: She is alert and oriented to person, place, and time  Vitals reviewed  Additional Data:     Labs:      Results from last 7 days  Lab Units 08/16/18  0508   WBC Thousand/uL 7 72   HEMOGLOBIN g/dL 11 6   HEMATOCRIT % 35 4   PLATELETS Thousands/uL 292   NEUTROS PCT % 59   LYMPHS PCT % 29   MONOS PCT % 8   EOS PCT % 2       Results from last 7 days  Lab Units 08/16/18  0508  08/13/18  1836   SODIUM mmol/L 141  < > 136   POTASSIUM mmol/L 3 4*  < > 3 6   CHLORIDE mmol/L 106  < > 99*   CO2 mmol/L 26  < > 31   BUN mg/dL 4*  < > 9   CREATININE mg/dL 0 78  < > 0 92   CALCIUM mg/dL 8 8  < > 9 5   TOTAL PROTEIN g/dL  --   --  8 1   BILIRUBIN TOTAL mg/dL  --   --  0 60   ALK PHOS U/L  --   --  71   ALT U/L  --   --  24   AST U/L  --   --  16   GLUCOSE RANDOM mg/dL 118  < > 155*   < > = values in this interval not displayed  * I Have Reviewed All Lab Data Listed Above  * Additional Pertinent Lab Tests Reviewed:  All Labs For Current Hospital Admission Reviewed    Imaging:    Imaging Reports Reviewed Today Include:   Imaging Personally Reviewed by Myself Includes:      Recent Cultures (last 7 days):           Last 24 Hours Medication List:     Current Facility-Administered Medications:  acetaminophen 650 mg Oral Q6H PRN Yvone Manti, PA-C    atorvastatin 40 mg Oral Daily With [employer] , PA-C    docusate sodium 100 mg Oral BID Yvone Manti, PA-C    enoxaparin 40 mg Subcutaneous Daily Yvone Manti, PA-C    FLUoxetine 40 mg Oral Daily Yvone Manti, PA-C    insulin lispro 1-6 Units Subcutaneous TID AC Yvone Manti, PA-C    insulin lispro 1-6 Units Subcutaneous HS Yvone Manti, PA-C    morphine injection 1 mg Intravenous Q3H PRN Yvone Alto, PA-C    ondansetron 4 mg Intravenous Q6H PRN Yvone Alto, PA-C    piperacillin-tazobactam 3 375 g Intravenous Q6H Yvone Alto, PA-C Last Rate: 3 375 g (08/16/18 1340)   sodium chloride 125 mL/hr Intravenous Continuous Yvone Alto, PA-C Last Rate: 125 mL/hr (08/15/18 2326)        Today, Patient Was Seen By: Larry Phillip MD    ** Please Note: This note has been constructed using a voice recognition system   **

## 2018-08-16 NOTE — DISCHARGE INSTRUCTIONS
Antibiotics  You are being discharged on antibiotics  Finish all medication that is prescribed  Diverticulitis Discharge Instructions  · You will need to be on a diverticulitis diet (low residual/low fiber) for at least 2 weeks, then you can add higher fiber foods to transition to a diverticulosis diet (high fiber)  · You will need to follow up with Gastroenterology (GI) for a colonoscopy in 6-8 weeks  You should not have a colonoscopy with an active diverticulitis infection  Diverticulitis Diet (low residual/low fiber)    WHAT YOU NEED TO KNOW:   A diverticulitis diet includes foods that allow your intestines to rest while you have diverticulitis  Diverticulitis is a condition that causes small pockets along your intestine called diverticula to become inflamed or infected  This is caused by hard bowel movement, food, or bacteria that get stuck in the pockets  DISCHARGE INSTRUCTIONS:   Foods you can eat while you have diverticulitis:   · A clear liquid diet may be recommended for 2 to 3 days  A clear liquid diet is made up of clear liquids and foods that are liquid at room temperature  Your healthcare provider will tell you when you can start eating solid foods  Examples of clear liquids include the following:      ? Water and clear juices (such as apple, cranberry, or grape), strained citrus juices or fruit punch     ? Coffee or tea (without cream or milk)     ? Clear sports drinks or soft drinks, such as ginger ale, lemon-lime soda, or club soda (no cola or root beer)     ? Clear broth, bouillon, or consommé     ? Plain popsicles (no popsicles with pureed fruit or fiber)     ? Flavored gelatin without fruit     · A low-fiber diet may be recommended until your symptoms improve  Your healthcare provider will tell you when you can slowly add high-fiber foods back into your diet      ? Cream of wheat and finely ground grits     ?  White bread, white pasta, and white rice     ? Canned and well-cooked fruit without skins or seeds, and juice without pulp     ? Canned and well-cooked vegetables without skins or seeds, and vegetable juice     ? Cow's milk, lactose-free milk, soy milk, and rice milk     ? Yogurt, cottage cheese, and sherbet     ? Eggs, poultry (such as chicken and turkey), fish, and tender, ground, well-cooked beef      ? Tofu and smooth nut butters, such as peanut butter     ? Broth and strained soups made of low-fiber foods  Foods you should avoid while you have diverticulitis: Avoid foods that are high in fiber while you have symptoms of diverticulitis  Examples of high-fiber foods include the following:  · Whole grains and breads, and cereals made with whole grains     · Dried fruit, fresh fruit with skin, and fruit pulp     · Raw vegetables     · Cooked greens, such as spinach     · Tough meat and meat with gristle     · Legumes, such as moralez beans and lentils  Contact your healthcare provider if:   · Your symptoms do not get better, or they get worse       · You have questions about the foods you should eat      · You have questions or concerns about your condition or care  Diverticulosis Diet (High fiber)    WHAT YOU NEED TO KNOW:   What is a diverticulosis diet? A diverticulosis diet includes high-fiber foods  High-fiber foods help you have regular bowel movements  Extra fiber may decrease your risk of forming new diverticula (small pockets) in your intestine  A high-fiber diet may also help prevent diverticulitis  Diverticulitis is a painful condition that occurs when diverticula become inflamed or infected  You do not need to avoid nuts, seeds, corn, or popcorn while you are on a diverticulosis diet  How much fiber do I need? You may need 25 to 35 grams of fiber each day  Ask your dietitian or healthcare provider how much fiber you should have  Increase your intake of fiber slowly  When you eat more fiber, you may have gas and feel bloated   You may need to take a fiber supplement if you do not get enough fiber from food  Drink plenty of liquids as you increase the fiber in your diet  Your dietitian or healthcare provider may recommend 8 eight-ounce cups or more each day  Ask which liquids are best for you  Which foods are high in fiber? · Foods with at least 4 grams of fiber per serving:      ? ? to ½ cup of high-fiber cereal (check the nutrition label on the box)     ? ½ cup of blackberries or raspberries     ? 4 dried prunes     ? 1 cooked artichoke     ? ½ cup of cooked legumes, such as lentils, or red, kidney, and moralez beans     · Foods with 1 to 3 grams of fiber per serving:      ? 1 slice of whole-wheat, pumpernickel, or rye bread     ? 4 whole-wheat crackers     ? ½ cup of cereal with 1 to 3 grams of fiber per serving (check the nutrition label on the box)     ? 1 piece of fruit, such as an apple, banana, pear, kiwi, or orange     ? 3 dates     ? ½ cup of canned apricots, fruit cocktail, peaches, or pears     ? ½ cup of raw or cooked vegetables, such as carrots, cauliflower, cabbage, spinach, squash, or corn  When should I contact my healthcare provider? · You have questions about a high-fiber diet      · You have a change in your bowel movements      · You have an upset stomach       · You have a fever      · You have pain in your lower abdomen on the left side      · You have questions about your condition or care  ·   CARE AGREEMENT:   You have the right to help plan your care  Learn about your health condition and how it may be treated  Discuss treatment options with your caregivers to decide what care you want to receive  You always have the right to refuse treatment  The above information is an  only  It is not intended as medical advice for individual conditions or treatments  Talk to your doctor, nurse or pharmacist before following any medical regimen to see if it is safe and effective for you                        Diverticulitis   WHAT YOU NEED TO KNOW: Diverticulitis is a condition that causes small pockets along your intestine called diverticula to become inflamed or infected  This is caused by hard bowel movements, food, or bacteria that get stuck in the pockets         DISCHARGE INSTRUCTIONS:   Seek care immediately if:   · You have bowel movement or foul-smelling discharge leaking from your vagina or in your urine      · You have severe diarrhea      · You urinate less than usual or not at all      · You are not able to have a bowel movement      · You cannot stop vomiting       · You have severe abdominal pain, a fever, and your abdomen is larger than usual       · You have new or increased blood in your bowel movements  Contact your healthcare provider if:   · You have pain when you urinate      · Your symptoms get worse or do not go away       · You have questions or concerns about your condition or care  Medicines:   · Antibiotics may be given to help prevent or treat a bacterial infection      · Prescription pain medicine may be given  Ask your healthcare provider how to take this medicine safely  Some prescription pain medicines contain acetaminophen  Do not take other medicines that contain acetaminophen without talking to your healthcare provider  Too much acetaminophen may cause liver damage  Prescription pain medicine may cause constipation  Ask your healthcare provider how to prevent or treat constipation       · Take your medicine as directed  Contact your healthcare provider if you think your medicine is not helping or if you have side effects  Tell him or her if you are allergic to any medicine  Keep a list of the medicines, vitamins, and herbs you take  Include the amounts, and when and why you take them  Bring the list or the pill bottles to follow-up visits  Carry your medicine list with you in case of an emergency  Clear liquid diet: A clear liquid diet includes any liquids that you can see through   Examples include water, ginger-khadar, cranberry or apple juice, frozen fruit ice, or broth  Stay on a clear liquid diet until your symptoms are gone, or as directed  Follow up with your healthcare provider as directed: You may need to return for a colonoscopy  When your symptoms are gone, you may need a low-fat, high-fiber diet to help prevent diverticulitis from developing again  Your healthcare provider or dietitian can help you create meal plans  Write down your questions so you remember to ask them during your visits  © 2017 [employer]  Information is for End User's use only and may not be sold, redistributed or otherwise used for commercial purposes  All illustrations and images included in CareNotes® are the copyrighted property of [employer]  or [employer] Sant[employer]  The above information is an  only  It is not intended as medical advice for individual conditions or treatments  Talk to your doctor, nurse or pharmacist before following any medical regimen to see if it is safe and effective for you

## 2018-08-17 ENCOUNTER — APPOINTMENT (INPATIENT)
Dept: RADIOLOGY | Facility: HOSPITAL | Age: 53
DRG: 244 | End: 2018-08-17
Payer: COMMERCIAL

## 2018-08-17 LAB
ANION GAP SERPL CALCULATED.3IONS-SCNC: 8 MMOL/L (ref 4–13)
BASOPHILS # BLD AUTO: 0.06 THOUSANDS/ΜL (ref 0–0.1)
BASOPHILS NFR BLD AUTO: 1 % (ref 0–1)
BUN SERPL-MCNC: 2 MG/DL (ref 5–25)
CALCIUM SERPL-MCNC: 8.4 MG/DL (ref 8.3–10.1)
CHLORIDE SERPL-SCNC: 106 MMOL/L (ref 100–108)
CO2 SERPL-SCNC: 27 MMOL/L (ref 21–32)
CREAT SERPL-MCNC: 0.78 MG/DL (ref 0.6–1.3)
EOSINOPHIL # BLD AUTO: 0.17 THOUSAND/ΜL (ref 0–0.61)
EOSINOPHIL NFR BLD AUTO: 2 % (ref 0–6)
ERYTHROCYTE [DISTWIDTH] IN BLOOD BY AUTOMATED COUNT: 13.3 % (ref 11.6–15.1)
GFR SERPL CREATININE-BSD FRML MDRD: 88 ML/MIN/1.73SQ M
GLUCOSE SERPL-MCNC: 101 MG/DL (ref 65–140)
GLUCOSE SERPL-MCNC: 108 MG/DL (ref 65–140)
GLUCOSE SERPL-MCNC: 118 MG/DL (ref 65–140)
GLUCOSE SERPL-MCNC: 121 MG/DL (ref 65–140)
GLUCOSE SERPL-MCNC: 150 MG/DL (ref 65–140)
HCT VFR BLD AUTO: 33.3 % (ref 34.8–46.1)
HGB BLD-MCNC: 10.9 G/DL (ref 11.5–15.4)
IMM GRANULOCYTES # BLD AUTO: 0.08 THOUSAND/UL (ref 0–0.2)
IMM GRANULOCYTES NFR BLD AUTO: 1 % (ref 0–2)
LYMPHOCYTES # BLD AUTO: 2.23 THOUSANDS/ΜL (ref 0.6–4.47)
LYMPHOCYTES NFR BLD AUTO: 27 % (ref 14–44)
MCH RBC QN AUTO: 29.2 PG (ref 26.8–34.3)
MCHC RBC AUTO-ENTMCNC: 32.7 G/DL (ref 31.4–37.4)
MCV RBC AUTO: 89 FL (ref 82–98)
MONOCYTES # BLD AUTO: 0.59 THOUSAND/ΜL (ref 0.17–1.22)
MONOCYTES NFR BLD AUTO: 7 % (ref 4–12)
NEUTROPHILS # BLD AUTO: 5.09 THOUSANDS/ΜL (ref 1.85–7.62)
NEUTS SEG NFR BLD AUTO: 62 % (ref 43–75)
NRBC BLD AUTO-RTO: 0 /100 WBCS
PLATELET # BLD AUTO: 295 THOUSANDS/UL (ref 149–390)
PMV BLD AUTO: 9.8 FL (ref 8.9–12.7)
POTASSIUM SERPL-SCNC: 3.5 MMOL/L (ref 3.5–5.3)
RBC # BLD AUTO: 3.73 MILLION/UL (ref 3.81–5.12)
SODIUM SERPL-SCNC: 141 MMOL/L (ref 136–145)
WBC # BLD AUTO: 8.22 THOUSAND/UL (ref 4.31–10.16)

## 2018-08-17 PROCEDURE — 99232 SBSQ HOSP IP/OBS MODERATE 35: CPT | Performed by: SURGERY

## 2018-08-17 PROCEDURE — 80048 BASIC METABOLIC PNL TOTAL CA: CPT | Performed by: INTERNAL MEDICINE

## 2018-08-17 PROCEDURE — 99222 1ST HOSP IP/OBS MODERATE 55: CPT | Performed by: INTERNAL MEDICINE

## 2018-08-17 PROCEDURE — 82948 REAGENT STRIP/BLOOD GLUCOSE: CPT

## 2018-08-17 PROCEDURE — 85025 COMPLETE CBC W/AUTO DIFF WBC: CPT | Performed by: INTERNAL MEDICINE

## 2018-08-17 PROCEDURE — 74022 RADEX COMPL AQT ABD SERIES: CPT

## 2018-08-17 RX ORDER — OXYCODONE HYDROCHLORIDE AND ACETAMINOPHEN 5; 325 MG/1; MG/1
1 TABLET ORAL EVERY 4 HOURS PRN
Status: DISCONTINUED | OUTPATIENT
Start: 2018-08-17 | End: 2018-08-18 | Stop reason: HOSPADM

## 2018-08-17 RX ADMIN — PIPERACILLIN SODIUM,TAZOBACTAM SODIUM 3.38 G: 3; .375 INJECTION, POWDER, FOR SOLUTION INTRAVENOUS at 14:26

## 2018-08-17 RX ADMIN — DOCUSATE SODIUM 100 MG: 100 CAPSULE, LIQUID FILLED ORAL at 09:14

## 2018-08-17 RX ADMIN — PIPERACILLIN SODIUM,TAZOBACTAM SODIUM 3.38 G: 3; .375 INJECTION, POWDER, FOR SOLUTION INTRAVENOUS at 02:33

## 2018-08-17 RX ADMIN — OXYCODONE HYDROCHLORIDE AND ACETAMINOPHEN 1 TABLET: 5; 325 TABLET ORAL at 18:00

## 2018-08-17 RX ADMIN — PIPERACILLIN SODIUM,TAZOBACTAM SODIUM 3.38 G: 3; .375 INJECTION, POWDER, FOR SOLUTION INTRAVENOUS at 20:29

## 2018-08-17 RX ADMIN — FLUOXETINE 40 MG: 20 CAPSULE ORAL at 09:15

## 2018-08-17 RX ADMIN — INSULIN LISPRO 1 UNITS: 100 INJECTION, SOLUTION INTRAVENOUS; SUBCUTANEOUS at 17:56

## 2018-08-17 RX ADMIN — PIPERACILLIN SODIUM,TAZOBACTAM SODIUM 3.38 G: 3; .375 INJECTION, POWDER, FOR SOLUTION INTRAVENOUS at 08:30

## 2018-08-17 RX ADMIN — ENOXAPARIN SODIUM 40 MG: 40 INJECTION SUBCUTANEOUS at 09:14

## 2018-08-17 RX ADMIN — SODIUM CHLORIDE 125 ML/HR: 0.9 INJECTION, SOLUTION INTRAVENOUS at 06:15

## 2018-08-17 RX ADMIN — ATORVASTATIN CALCIUM 40 MG: 40 TABLET, FILM COATED ORAL at 16:38

## 2018-08-17 NOTE — CASE MANAGEMENT
Continued Stay Review    Date: 8/17    Vital Signs: /63 (BP Location: Left arm)   Pulse 60   Temp 97 8 °F (36 6 °C) (Oral)   Resp 18   Ht 5' 1" (1 549 m)   Wt 109 kg (239 lb 10 2 oz)   SpO2 94%   BMI 45 28 kg/m²     Medications:   Scheduled Meds:   Current Facility-Administered Medications:  acetaminophen 650 mg Oral Q6H PRN Ariadne Precise, PA-C    atorvastatin 40 mg Oral Daily With [employer] , PA-C    docusate sodium 100 mg Oral BID Ariadne Precise, PA-C    enoxaparin 40 mg Subcutaneous Daily Ariadne Precise, PA-C    FLUoxetine 40 mg Oral Daily Ariadne Precise, PA-C    insulin lispro 1-6 Units Subcutaneous TID AC Ariadne Precise, PA-C    insulin lispro 1-6 Units Subcutaneous HS Ariadne Precise, PA-C    ondansetron 4 mg Intravenous Q6H PRN Ariadne Precise, PA-C    oxyCODONE-acetaminophen 1 tablet Oral Q4H PRN Nadara Gell, PA-C    piperacillin-tazobactam 3 375 g Intravenous Q6H Ariadne Precise, PA-C Last Rate: 3 375 g (08/17/18 1426)     Continuous Infusions:    PRN Meds:   acetaminophen    ondansetron    oxyCODONE-acetaminophen    Abnormal Labs/Diagnostic Results: BUN creat   2  0 78, H&H   10 9  33 3    Age/Sex: 47 y o  female     Assessment/Plan:    Assessment:     Principal Problem:    Diverticulitis  Active Problems:    Type 2 diabetes mellitus, without long-term current use of insulin (HCC)   Plan:   · 1  Abdominal pain secondary to diverticulitis with microperforation- on zosyn  Surgery following  Patient noncompliant with medications at home  She takes her medication for diverticulitis treatment at home for 1-2 days and then stops once she feels better  Patient abdominal pain improving  She is tolerating her liquid diet and will be advanced to surgical soft by surgery  · 2  DM- on ISS  · 3  Depression- on prozac  · 4    Hypokalemia- repleted  VTE Pharmacologic Prophylaxis:   Pharmacologic: Enoxaparin (Lovenox)  Mechanical VTE Prophylaxis in Place: Yes   Patient Centered Rounds: I have performed bedside rounds with nursing staff today    Discussions with Specialists or Other Care Team Provider:    Education and Discussions with Family / Patient:    Time Spent for Care: 30 minutes  More than 50% of total time spent on counseling and coordination of care as described above    Current Length of Stay: 4 day(s)   Current Patient Status: Inpatient   Certification Statement: The patient will continue to require additional inpatient hospital stay due to diverticulitis   Discharge Plan / Estimated Discharge Date: once abdominal pain improves  Surgery note  8/17   Assessment/Plan  Recurrent Diverticulitis with microperforation  Abd Distention  Pt complains of distention and being full of gas  The gas movement is painful  She is passing gas and passing more  formed stool now  Was having diarrhea when she came in  Tolerating CLD  -will check obstruction series due to increase in distention  -will consider soft surgical diet if OS is ok     -cont antibiotics  Monster Virk   -d/c morphine change to percocet

## 2018-08-17 NOTE — PROGRESS NOTES
Progress Note - General Surgery   Chauncey Russo 47 y o  female MRN: [id number]  Unit/Bed#: -01 Encounter: [id number]    Assessment/Plan  Recurrent Diverticulitis with microperforation  Abd Distention  Pt complains of distention and being full of gas  The gas movement is painful  She is passing gas and passing more  formed stool now  Was having diarrhea when she came in  Tolerating CLD  -will check obstruction series due to increase in distention  -will consider soft surgical diet if OS is ok     -cont antibiotics  Nathan Beyer   -d/c morphine change to percocet       Chief Complaint: I feel distended, more than my usual big round belly  I can feel the gas moving through and it is painful  This is not the same pain on admission  I pass some gas and had more formed stool  Not the diarrhea that I came in with  I want to eat  Objective Directed Exam:  abd grossly distended, NBS, sounds are not tympanic  She feels this is more than her normal rotund belly  abd is soft  LLQ and low midline tenderness  Blood pressure 135/63, pulse 60, temperature 97 8 °F (36 6 °C), temperature source Oral, resp  rate 18, height 5' 1" (1 549 m), weight 109 kg (239 lb 10 2 oz), SpO2 94 %  ,Body mass index is 45 28 kg/m²        Intake/Output Summary (Last 24 hours) at 08/17/18 1307  Last data filed at 08/17/18 0429   Gross per 24 hour   Intake          3852 08 ml   Output              200 ml   Net          3652 08 ml       Invasive Devices     Peripheral Intravenous Line            Peripheral IV 08/13/18 Right Antecubital 3 days                Physical Exam:   General Appearance:    Alert and orientated x 3, cooperative, no distress   Lungs:     Clear to auscultation bilaterally, respirations unlabored    Heart:    Regular rate and rhythm   Abdomen:     As above     Wound/Dressing:  C/d/i,       Extremities:   Extremities normal,  no cyanosis or edema   Pulses:   2+ and symmetric all extremities, no calf tenderness   Skin: Skin color, texture, turgor normal, no rashes or lesions   Neurologic:   CNII-XII intact, normal strength, sensation and reflexes     Throughout, affect appropriate                           Labs:   CBC with diff: Lab Results   Component Value Date    WBC 8 22 08/17/2018    HGB 10 9 (L) 08/17/2018    HCT 33 3 (L) 08/17/2018    MCV 89 08/17/2018     08/17/2018    MCH 29 2 08/17/2018    MCHC 32 7 08/17/2018    RDW 13 3 08/17/2018    MPV 9 8 08/17/2018    NRBC 0 08/17/2018   ,   BMP/CMP:  Lab Results   Component Value Date     08/17/2018    K 3 5 08/17/2018     08/17/2018    CO2 27 08/17/2018    ANIONGAP 8 08/17/2018    BUN 2 (L) 08/17/2018    CREATININE 0 78 08/17/2018    GLUCOSE 101 08/17/2018    CALCIUM 8 4 08/17/2018    AST 16 08/13/2018    ALT 24 08/13/2018    ALKPHOS 71 08/13/2018    PROT 8 1 08/13/2018    BILITOT 0 60 08/13/2018    EGFR 88 08/17/2018   ,   Lipid Panel: No results found for: CHOL,   Coags: No results found for: PT, PTT, INR,     Blood Culture: No results found for: BLOODCX,   Urinalysis: Lab Results   Component Value Date    COLORU Isa 08/13/2018    CLARITYU Clear 08/13/2018    SPECGRAV >=1 030 08/13/2018    PHUR 6 0 08/13/2018    LEUKOCYTESUR Negative 08/13/2018    NITRITE Negative 08/13/2018    PROTEINUA >=300 (A) 08/13/2018    GLUCOSEU 100 (1/10%) (A) 08/13/2018    KETONESU Negative 08/13/2018    BILIRUBINUR Small (A) 08/13/2018    BLOODU Small (A) 08/13/2018   ,   Urine Culture: No results found for: URINECX,   Wound Culure: No results found for: WOUNDCULT      Imaging: Ct Abdomen Pelvis With Contrast    Result Date: 8/13/2018  Impression: Worsening sigmoid diverticulitis  Single tiny focus of air is identified along the anterior portion of the proximal sigmoid colon could represent free air from microperforation  Hepatic steatosis and hepatomegaly    I personally discussed this study with BRENNON Acuna on 8/13/2018 at 9:31 PM  Workstation performed: [id number] Bebeto Montero PA-C  8/17/2018

## 2018-08-17 NOTE — H&P
[facility] Progress Note  Patient: Radha Taveras 47 y o  female   MRN: [id number]  PCP: Tawana De Jesus DO  Unit/Bed#: MS 318[facility] Encounter: [id number]  Date Of Visit: 18    Assessment:    Principal Problem:    Diverticulitis  Active Problems:    Type 2 diabetes mellitus, without long-term current use of insulin ([address] )      Plan:    · 1  Abdominal pain secondary to diverticulitis with microperforation- on zosyn  Surgery following  Patient noncompliant with medications at home  She takes her medication for diverticulitis treatment at home for 1-2 days and then stops once she feels better  Patient abdominal pain improving  She is tolerating her liquid diet and will be advanced to surgical soft by surgery  · 2  DM- on ISS  · 3  Depression- on prozac  · 4  Hypokalemia- repleted       VTE Pharmacologic Prophylaxis:   Pharmacologic: Enoxaparin (Lovenox)  Mechanical VTE Prophylaxis in Place: Yes    Patient Centered Rounds: I have performed bedside rounds with nursing staff today  Discussions with Specialists or Other Care Team Provider:     Education and Discussions with Family / Patient:     Time Spent for Care: 30 minutes  More than 50% of total time spent on counseling and coordination of care as described above  Current Length of Stay: 4 day(s)    Current Patient Status: Inpatient   Certification Statement: The patient will continue to require additional inpatient hospital stay due to diverticulitis    Discharge Plan / Estimated Discharge Date: once abdominal pain improves  Code Status: Level 1 - Full Code      Subjective:   Patient seen and examined at bedside  Patient has no new complaints  Objective:     Vitals:   Temp (24hrs), Av 1 °F (36 7 °C), Min:97 8 °F (36 6 °C), Max:98 4 °F (36 9 °C)    HR:  [59-66] 60  Resp:  [18] 18  BP: (121-135)/(60-63) 135/63  SpO2:  [94 %-97 %] 94 %  Body mass index is 45 28 kg/m²       Input and Output Summary (last 24 hours): Intake/Output Summary (Last 24 hours) at 08/17/18 1350  Last data filed at 08/17/18 0615   Gross per 24 hour   Intake          3852 08 ml   Output              200 ml   Net          3652 08 ml       Physical Exam:     Physical Exam   Constitutional: She is oriented to person, place, and time  She appears well-developed and well-nourished  HENT:   Head: Normocephalic and atraumatic  Eyes: Conjunctivae and EOM are normal  Pupils are equal, round, and reactive to light  Neck: Normal range of motion  Neck supple  No JVD present  No tracheal deviation present  No thyromegaly present  Cardiovascular: Normal rate, regular rhythm and normal heart sounds  Exam reveals no gallop and no friction rub  No murmur heard  Pulmonary/Chest: Effort normal and breath sounds normal  No respiratory distress  She has no wheezes  She has no rales  Abdominal: Soft  Bowel sounds are normal  She exhibits no distension  There is tenderness  There is no rebound  Musculoskeletal: Normal range of motion  She exhibits no edema  Neurological: She is alert and oriented to person, place, and time  Vitals reviewed  Additional Data:     Labs:      Results from last 7 days  Lab Units 08/17/18  0505   WBC Thousand/uL 8 22   HEMOGLOBIN g/dL 10 9*   HEMATOCRIT % 33 3*   PLATELETS Thousands/uL 295   NEUTROS PCT % 62   LYMPHS PCT % 27   MONOS PCT % 7   EOS PCT % 2       Results from last 7 days  Lab Units 08/17/18  0505  08/13/18  1836   SODIUM mmol/L 141  < > 136   POTASSIUM mmol/L 3 5  < > 3 6   CHLORIDE mmol/L 106  < > 99*   CO2 mmol/L 27  < > 31   BUN mg/dL 2*  < > 9   CREATININE mg/dL 0 78  < > 0 92   CALCIUM mg/dL 8 4  < > 9 5   TOTAL PROTEIN g/dL  --   --  8 1   BILIRUBIN TOTAL mg/dL  --   --  0 60   ALK PHOS U/L  --   --  71   ALT U/L  --   --  24   AST U/L  --   --  16   GLUCOSE RANDOM mg/dL 101  < > 155*   < > = values in this interval not displayed  * I Have Reviewed All Lab Data Listed Above    * Additional Pertinent Lab Tests Reviewed: [address] Admission Reviewed    Imaging:    Imaging Reports Reviewed Today Include:   Imaging Personally Reviewed by Myself Includes:      Recent Cultures (last 7 days):           Last 24 Hours Medication List:     Current Facility-Administered Medications:  acetaminophen 650 mg Oral Q6H PRN TAINA [place]-PADMINI    atorvastatin 40 mg Oral Daily With [employer] , PA-PADMINI    docusate sodium 100 mg Oral BID Shraddha Maxwell, PA-C    enoxaparin 40 mg Subcutaneous Daily Shraddha Maxwell, PA-C    FLUoxetine 40 mg Oral Daily TAINA [place]-C    insulin lispro 1-6 Units Subcutaneous TID AC Shraddha Maxwell, PA-C    insulin lispro 1-6 Units Subcutaneous HS TAINA [place]-PADMINI    ondansetron 4 mg Intravenous Q6H PRN TAINA [place]-PADMINI    oxyCODONE-acetaminophen 1 tablet Oral Q4H PRN TAINA Sahni-C    piperacillin-tazobactam 3 375 g Intravenous Q6H TAINA [place]-PADMINI Last Rate: 3 375 g (08/17/18 0830)        Today, Patient Was Seen By: Burgess Johan MD    ** Please Note: This note has been constructed using a voice recognition system   **

## 2018-08-17 NOTE — PROGRESS NOTES
Pt tolerating clear liquid diet with noted slight abdominal cramping, but tolerable  Will continue to monitor pt

## 2018-08-18 VITALS
RESPIRATION RATE: 18 BRPM | HEART RATE: 62 BPM | TEMPERATURE: 98.8 F | WEIGHT: 239.64 LBS | OXYGEN SATURATION: 90 % | SYSTOLIC BLOOD PRESSURE: 127 MMHG | DIASTOLIC BLOOD PRESSURE: 78 MMHG | HEIGHT: 61 IN | BODY MASS INDEX: 45.24 KG/M2

## 2018-08-18 LAB
ANION GAP SERPL CALCULATED.3IONS-SCNC: 6 MMOL/L (ref 4–13)
BASOPHILS # BLD AUTO: 0.07 THOUSANDS/ΜL (ref 0–0.1)
BASOPHILS NFR BLD AUTO: 1 % (ref 0–1)
BUN SERPL-MCNC: 4 MG/DL (ref 5–25)
CALCIUM SERPL-MCNC: 8.8 MG/DL (ref 8.3–10.1)
CHLORIDE SERPL-SCNC: 105 MMOL/L (ref 100–108)
CO2 SERPL-SCNC: 28 MMOL/L (ref 21–32)
CREAT SERPL-MCNC: 0.87 MG/DL (ref 0.6–1.3)
EOSINOPHIL # BLD AUTO: 0.16 THOUSAND/ΜL (ref 0–0.61)
EOSINOPHIL NFR BLD AUTO: 2 % (ref 0–6)
ERYTHROCYTE [DISTWIDTH] IN BLOOD BY AUTOMATED COUNT: 13.5 % (ref 11.6–15.1)
GFR SERPL CREATININE-BSD FRML MDRD: 77 ML/MIN/1.73SQ M
GLUCOSE SERPL-MCNC: 102 MG/DL (ref 65–140)
GLUCOSE SERPL-MCNC: 107 MG/DL (ref 65–140)
GLUCOSE SERPL-MCNC: 214 MG/DL (ref 65–140)
HCT VFR BLD AUTO: 35.9 % (ref 34.8–46.1)
HGB BLD-MCNC: 11.6 G/DL (ref 11.5–15.4)
IMM GRANULOCYTES # BLD AUTO: 0.14 THOUSAND/UL (ref 0–0.2)
IMM GRANULOCYTES NFR BLD AUTO: 2 % (ref 0–2)
LYMPHOCYTES # BLD AUTO: 2.65 THOUSANDS/ΜL (ref 0.6–4.47)
LYMPHOCYTES NFR BLD AUTO: 32 % (ref 14–44)
MCH RBC QN AUTO: 29.3 PG (ref 26.8–34.3)
MCHC RBC AUTO-ENTMCNC: 32.3 G/DL (ref 31.4–37.4)
MCV RBC AUTO: 91 FL (ref 82–98)
MONOCYTES # BLD AUTO: 0.55 THOUSAND/ΜL (ref 0.17–1.22)
MONOCYTES NFR BLD AUTO: 7 % (ref 4–12)
NEUTROPHILS # BLD AUTO: 4.71 THOUSANDS/ΜL (ref 1.85–7.62)
NEUTS SEG NFR BLD AUTO: 56 % (ref 43–75)
NRBC BLD AUTO-RTO: 0 /100 WBCS
PLATELET # BLD AUTO: 301 THOUSANDS/UL (ref 149–390)
PMV BLD AUTO: 9.8 FL (ref 8.9–12.7)
POTASSIUM SERPL-SCNC: 3.5 MMOL/L (ref 3.5–5.3)
RBC # BLD AUTO: 3.96 MILLION/UL (ref 3.81–5.12)
SODIUM SERPL-SCNC: 139 MMOL/L (ref 136–145)
WBC # BLD AUTO: 8.28 THOUSAND/UL (ref 4.31–10.16)

## 2018-08-18 PROCEDURE — 80048 BASIC METABOLIC PNL TOTAL CA: CPT | Performed by: INTERNAL MEDICINE

## 2018-08-18 PROCEDURE — 82948 REAGENT STRIP/BLOOD GLUCOSE: CPT

## 2018-08-18 PROCEDURE — 99231 SBSQ HOSP IP/OBS SF/LOW 25: CPT | Performed by: SURGERY

## 2018-08-18 PROCEDURE — 85025 COMPLETE CBC W/AUTO DIFF WBC: CPT | Performed by: INTERNAL MEDICINE

## 2018-08-18 PROCEDURE — 99239 HOSP IP/OBS DSCHRG MGMT >30: CPT | Performed by: INTERNAL MEDICINE

## 2018-08-18 RX ORDER — METRONIDAZOLE 500 MG/1
500 TABLET ORAL EVERY 8 HOURS SCHEDULED
Qty: 9 TABLET | Refills: 0 | Status: SHIPPED | OUTPATIENT
Start: 2018-08-18 | End: 2018-08-24

## 2018-08-18 RX ORDER — CIPROFLOXACIN 500 MG/1
500 TABLET, FILM COATED ORAL EVERY 12 HOURS SCHEDULED
Qty: 6 TABLET | Refills: 0 | Status: SHIPPED | OUTPATIENT
Start: 2018-08-18 | End: 2018-08-24

## 2018-08-18 RX ORDER — CIPROFLOXACIN 500 MG/1
500 TABLET, FILM COATED ORAL EVERY 12 HOURS SCHEDULED
Qty: 6 TABLET | Refills: 0 | Status: SHIPPED | OUTPATIENT
Start: 2018-08-18 | End: 2018-08-18

## 2018-08-18 RX ORDER — METRONIDAZOLE 500 MG/1
500 TABLET ORAL EVERY 8 HOURS SCHEDULED
Qty: 9 TABLET | Refills: 0 | Status: SHIPPED | OUTPATIENT
Start: 2018-08-18 | End: 2018-08-18

## 2018-08-18 RX ADMIN — PIPERACILLIN SODIUM,TAZOBACTAM SODIUM 3.38 G: 3; .375 INJECTION, POWDER, FOR SOLUTION INTRAVENOUS at 01:21

## 2018-08-18 RX ADMIN — ENOXAPARIN SODIUM 40 MG: 40 INJECTION SUBCUTANEOUS at 09:38

## 2018-08-18 RX ADMIN — FLUOXETINE 40 MG: 20 CAPSULE ORAL at 09:38

## 2018-08-18 RX ADMIN — OXYCODONE HYDROCHLORIDE AND ACETAMINOPHEN 1 TABLET: 5; 325 TABLET ORAL at 01:22

## 2018-08-18 RX ADMIN — PIPERACILLIN SODIUM,TAZOBACTAM SODIUM 3.38 G: 3; .375 INJECTION, POWDER, FOR SOLUTION INTRAVENOUS at 07:32

## 2018-08-18 NOTE — PROGRESS NOTES
Progress Note - General Surgery   Yaya Sandoval 47 y o  female MRN: [id number]  Unit/Bed#: -01 Encounter: [id number]    Assessment:  Diverticulitis, improved    Plan:  Patient may be discharged home and follow up in our office as an outpatient in 2 weeks  Course of antibiotics for a total of 10 days  The patient was encouraged to follow up with GI for colonoscopy    Subjective/Objective   Chief Complaint:  Occasional crampy abdominal pain, denies any nausea vomiting  Still passing stool    Subjective:  Denies any chest pain or shortness of breath    Objective:     Blood pressure 127/78, pulse 62, temperature 98 8 °F (37 1 °C), temperature source Oral, resp  rate 18, height 5' 1" (1 549 m), weight 109 kg (239 lb 10 2 oz), SpO2 90 %  ,Body mass index is 45 28 kg/m²  Intake/Output Summary (Last 24 hours) at 08/18/18 1128  Last data filed at 08/17/18 1812   Gross per 24 hour   Intake              280 ml   Output                0 ml   Net              280 ml       Invasive Devices     Peripheral Intravenous Line            Peripheral IV 08/16/18 Right Forearm 1 day                Physical Exam:  Abdomen is obese, soft, nondistended and nontender      Lab, Imaging and other studies:  CBC:   Lab Results   Component Value Date    WBC 8 28 08/18/2018    HGB 11 6 08/18/2018    HCT 35 9 08/18/2018    MCV 91 08/18/2018     08/18/2018    MCH 29 3 08/18/2018    MCHC 32 3 08/18/2018    RDW 13 5 08/18/2018    MPV 9 8 08/18/2018    NRBC 0 08/18/2018   , CMP:   Lab Results   Component Value Date     08/18/2018    K 3 5 08/18/2018     08/18/2018    CO2 28 08/18/2018    ANIONGAP 6 08/18/2018    BUN 4 (L) 08/18/2018    CREATININE 0 87 08/18/2018    GLUCOSE 102 08/18/2018    CALCIUM 8 8 08/18/2018    EGFR 77 08/18/2018     VTE Pharmacologic Prophylaxis: Enoxaparin (Lovenox)  VTE Mechanical Prophylaxis: sequential compression device

## 2018-08-18 NOTE — DISCHARGE SUMMARY
Discharge Summary - [facility] Internal Medicine    [facility] Information[facility]  [facility] MRN: [id number]  Unit/Bed#: -01 Encounter: [id number]    Discharging Physician / Practitioner: Zackery Kocher, MD  PCP: Kathy Lafleur DO  Admission Date: 8/13/2018  Discharge Date: 08/18/18    Disposition:     Home    Reason for Admission: Abdominal pain    Discharge Diagnoses:     Principal Problem:    Diverticulitis  Active Problems:    Type 2 diabetes mellitus, without long-term current use of insulin ([address] )  Resolved Problems:    * No resolved hospital problems  *      Consultations During Hospital Stay:  · surgery    Procedures Performed:     · none    Significant Findings / Test Results:     · CT abdomen  Impression:       Worsening sigmoid diverticulitis   Single tiny focus of air is identified along the anterior portion of the proximal sigmoid colon could represent free air from microperforation  Hepatic steatosis and hepatomegaly  ·     Incidental Findings:   ·      Test Results Pending at Discharge (will require follow up):   ·      Outpatient Tests Requested:  ·     Complications:  None    History of Present Illness:     Manny Gregorio is a [facility] y   female who presents with complaints of abdominal pain that has been present since Friday  Patient states she was diagnosed with diverticulitis in June, she was treated outpatient  States that she never completed her oral Cipro and Flagyl at that time  She states on Friday which began having abdominal pain again she resumed the previous prescription of Cipro Flagyl she had been prescribed  Patient states she has been having increasing pain despite this  She denies any nausea, vomiting, fevers, diarrhea  She denies chest pain or shortness of breath  Hospital Course:     Manny Gregorio is a [facility]  female patient who originally presented to the hospital on 8/13/2018 due to complaints of abdominal pain    Patient found to have diverticulitis and placed on IV abx  Surgery was also consulted  Patient abdominal pain started to improved and her diet was advance  She is currently hemodynamically stable and will be discharged home  Patient advised to finish her 10 day course of cipro and flagyl  Patient to followup with surgery in 2 weeks as outpatient  Condition at Discharge: stable     Discharge Day Visit / Exam:     Subjective:  Patient seen and examined at bedside  Patient has no new complaints  Vitals: Blood Pressure: 127/78 (08/18/18 0700)  Pulse: 62 (08/18/18 0700)  Temperature: 98 8 °F (37 1 °C) (08/18/18 0700)  Temp Source: Oral (08/18/18 0700)  Respirations: 18 (08/18/18 0700)  Height: 5' 1" (154 9 cm) (08/15/18 1201)  Weight - Scale: 109 kg (239 lb 10 2 oz) (08/13/18 2239)  SpO2: 90 % (08/18/18 0700)  Exam:   Physical Exam   Constitutional: She is oriented to person, place, and time  She appears well-developed and well-nourished  HENT:   Head: Normocephalic and atraumatic  Eyes: Conjunctivae and EOM are normal  Pupils are equal, round, and reactive to light  Neck: Normal range of motion  Neck supple  No JVD present  No tracheal deviation present  No thyromegaly present  Cardiovascular: Normal rate, regular rhythm and normal heart sounds  Exam reveals no gallop and no friction rub  No murmur heard  Pulmonary/Chest: Effort normal and breath sounds normal  No respiratory distress  She has no wheezes  She has no rales  Abdominal: Soft  Bowel sounds are normal  She exhibits no distension  There is no tenderness  There is no rebound  Musculoskeletal: Normal range of motion  She exhibits no edema  Neurological: She is alert and oriented to person, place, and time  Vitals reviewed  Discussion with Family:     Discharge instructions/Information to patient and family:   See after visit summary for information provided to patient and family        Provisions for Follow-Up Care:  See after visit summary for information related to follow-up care and any pertinent home health orders  Planned Readmission: none     Discharge Statement:  I spent 50 minutes discharging the patient  This time was spent on the day of discharge  I had direct contact with the patient on the day of discharge  Greater than 50% of the total time was spent examining patient, answering all patient questions, arranging and discussing plan of care with patient as well as directly providing post-discharge instructions  Additional time then spent on discharge activities  Discharge Medications:  See after visit summary for reconciled discharge medications provided to patient and family        ** Please Note: This note has been constructed using a voice recognition system **